# Patient Record
Sex: FEMALE | Race: WHITE | Employment: UNEMPLOYED | URBAN - METROPOLITAN AREA
[De-identification: names, ages, dates, MRNs, and addresses within clinical notes are randomized per-mention and may not be internally consistent; named-entity substitution may affect disease eponyms.]

---

## 2022-03-21 ENCOUNTER — APPOINTMENT (OUTPATIENT)
Dept: GENERAL RADIOLOGY | Age: 52
DRG: 418 | End: 2022-03-21

## 2022-03-21 ENCOUNTER — APPOINTMENT (OUTPATIENT)
Dept: CT IMAGING | Age: 52
DRG: 418 | End: 2022-03-21

## 2022-03-21 ENCOUNTER — HOSPITAL ENCOUNTER (INPATIENT)
Age: 52
LOS: 4 days | Discharge: HOME | DRG: 418 | End: 2022-03-26
Attending: INTERNAL MEDICINE | Admitting: INTERNAL MEDICINE

## 2022-03-21 DIAGNOSIS — R74.8 ELEVATED LIVER ENZYMES: ICD-10-CM

## 2022-03-21 DIAGNOSIS — R17 ICTERUS: ICD-10-CM

## 2022-03-21 DIAGNOSIS — R11.0 NAUSEA: ICD-10-CM

## 2022-03-21 DIAGNOSIS — K81.1 CHRONIC CHOLECYSTITIS: ICD-10-CM

## 2022-03-21 DIAGNOSIS — K80.50 CHOLEDOCHOLITHIASIS: Primary | ICD-10-CM

## 2022-03-21 DIAGNOSIS — R10.13 ABDOMINAL PAIN, EPIGASTRIC: ICD-10-CM

## 2022-03-21 LAB
A/G RATIO: 1.4 (ref 1.1–2.2)
ALBUMIN SERPL-MCNC: 4.2 G/DL (ref 3.4–5)
ALP BLD-CCNC: 281 U/L (ref 40–129)
ALT SERPL-CCNC: 369 U/L (ref 10–40)
AMMONIA: 37 UMOL/L (ref 11–51)
ANION GAP SERPL CALCULATED.3IONS-SCNC: 15 MMOL/L (ref 3–16)
AST SERPL-CCNC: 199 U/L (ref 15–37)
BACTERIA: ABNORMAL /HPF
BASOPHILS ABSOLUTE: 0.1 K/UL (ref 0–0.2)
BASOPHILS RELATIVE PERCENT: 1 %
BILIRUB SERPL-MCNC: 6.3 MG/DL (ref 0–1)
BILIRUBIN URINE: ABNORMAL
BLOOD, URINE: ABNORMAL
BUN BLDV-MCNC: 11 MG/DL (ref 7–20)
CALCIUM SERPL-MCNC: 9.6 MG/DL (ref 8.3–10.6)
CELLULAR CASTS: ABNORMAL /LPF
CHLORIDE BLD-SCNC: 96 MMOL/L (ref 99–110)
CLARITY: ABNORMAL
CO2: 23 MMOL/L (ref 21–32)
COLOR: ABNORMAL
COMMENT UA: ABNORMAL
CREAT SERPL-MCNC: <0.5 MG/DL (ref 0.6–1.1)
EOSINOPHILS ABSOLUTE: 0.3 K/UL (ref 0–0.6)
EOSINOPHILS RELATIVE PERCENT: 5 %
EPITHELIAL CELLS, UA: 11 /HPF (ref 0–5)
GFR AFRICAN AMERICAN: >60
GFR NON-AFRICAN AMERICAN: >60
GLUCOSE BLD-MCNC: 130 MG/DL (ref 70–99)
GLUCOSE URINE: 100 MG/DL
HCG QUALITATIVE: NEGATIVE
HCT VFR BLD CALC: 42.8 % (ref 36–48)
HEMOGLOBIN: 14.4 G/DL (ref 12–16)
HYALINE CASTS: ABNORMAL /LPF (ref 0–2)
KETONES, URINE: 40 MG/DL
LEUKOCYTE ESTERASE, URINE: NEGATIVE
LIPASE: 25 U/L (ref 13–60)
LYMPHOCYTES ABSOLUTE: 1.2 K/UL (ref 1–5.1)
LYMPHOCYTES RELATIVE PERCENT: 19.2 %
MCH RBC QN AUTO: 30 PG (ref 26–34)
MCHC RBC AUTO-ENTMCNC: 33.8 G/DL (ref 31–36)
MCV RBC AUTO: 88.7 FL (ref 80–100)
MICROSCOPIC EXAMINATION: YES
MONOCYTES ABSOLUTE: 0.5 K/UL (ref 0–1.3)
MONOCYTES RELATIVE PERCENT: 7.8 %
MUCUS: ABNORMAL /LPF
NEUTROPHILS ABSOLUTE: 4.1 K/UL (ref 1.7–7.7)
NEUTROPHILS RELATIVE PERCENT: 67 %
NITRITE, URINE: NEGATIVE
PDW BLD-RTO: 14.2 % (ref 12.4–15.4)
PH UA: 5.5 (ref 5–8)
PLATELET # BLD: 207 K/UL (ref 135–450)
PMV BLD AUTO: 8.6 FL (ref 5–10.5)
POTASSIUM REFLEX MAGNESIUM: 4.4 MMOL/L (ref 3.5–5.1)
PROTEIN UA: 100 MG/DL
RBC # BLD: 4.82 M/UL (ref 4–5.2)
RBC UA: 5 /HPF (ref 0–4)
RENAL EPITHELIAL, UA: ABNORMAL /HPF (ref 0–1)
SODIUM BLD-SCNC: 134 MMOL/L (ref 136–145)
SPECIFIC GRAVITY UA: 1.02 (ref 1–1.03)
TOTAL PROTEIN: 7.3 G/DL (ref 6.4–8.2)
TROPONIN: <0.01 NG/ML
URINE REFLEX TO CULTURE: YES
URINE TYPE: ABNORMAL
UROBILINOGEN, URINE: 1 E.U./DL
WBC # BLD: 6 K/UL (ref 4–11)
WBC UA: 15 /HPF (ref 0–5)

## 2022-03-21 PROCEDURE — 84484 ASSAY OF TROPONIN QUANT: CPT

## 2022-03-21 PROCEDURE — 74177 CT ABD & PELVIS W/CONTRAST: CPT

## 2022-03-21 PROCEDURE — 82140 ASSAY OF AMMONIA: CPT

## 2022-03-21 PROCEDURE — 80053 COMPREHEN METABOLIC PANEL: CPT

## 2022-03-21 PROCEDURE — 85025 COMPLETE CBC W/AUTO DIFF WBC: CPT

## 2022-03-21 PROCEDURE — 6360000004 HC RX CONTRAST MEDICATION: Performed by: NURSE PRACTITIONER

## 2022-03-21 PROCEDURE — 83690 ASSAY OF LIPASE: CPT

## 2022-03-21 PROCEDURE — 84703 CHORIONIC GONADOTROPIN ASSAY: CPT

## 2022-03-21 PROCEDURE — 99283 EMERGENCY DEPT VISIT LOW MDM: CPT

## 2022-03-21 PROCEDURE — 81001 URINALYSIS AUTO W/SCOPE: CPT

## 2022-03-21 PROCEDURE — 87086 URINE CULTURE/COLONY COUNT: CPT

## 2022-03-21 PROCEDURE — 93005 ELECTROCARDIOGRAM TRACING: CPT | Performed by: NURSE PRACTITIONER

## 2022-03-21 PROCEDURE — 80074 ACUTE HEPATITIS PANEL: CPT

## 2022-03-21 PROCEDURE — 71046 X-RAY EXAM CHEST 2 VIEWS: CPT

## 2022-03-21 RX ADMIN — IOPAMIDOL 75 ML: 755 INJECTION, SOLUTION INTRAVENOUS at 21:14

## 2022-03-21 ASSESSMENT — ENCOUNTER SYMPTOMS
VOMITING: 1
WHEEZING: 0
SHORTNESS OF BREATH: 0
DIARRHEA: 0
NAUSEA: 1
COLOR CHANGE: 0
PHOTOPHOBIA: 0
COUGH: 0
SORE THROAT: 0
ABDOMINAL PAIN: 1
BACK PAIN: 0

## 2022-03-21 NOTE — ED PROVIDER NOTES
**ADVANCED PRACTICE PROVIDER, I HAVE EVALUATED THIS PATIENT**        [facility]      Pt Name: Sarah Ramirez  XQS:[id number]  [place] 1970  Date of evaluation: 3/21/2022  Provider: Merlin Ensign, APRN - CNP      Chief Complaint:    Chief Complaint   Patient presents with    Dizziness     abdominal pain 3 days ago that has since resolved. Yellowing in both eyes since this morning. Dizziness and headache x 1 day. Denies pain, 1 episode of vomiting last night. Nursing Notes, Past Medical Hx, Past Surgical Hx, Social Hx, Allergies, and Family Hx were all reviewed and agreed with or any disagreements were addressed in the HPI.    HPI: (Location, Duration, Timing, Severity, Quality, Assoc Sx, Context, Modifying factors)    Chief Complaint of dizziness, abdominal pain and not feeling well    This is a  49 y.o. female who presents today with dizziness that is been going on for the past week however over the past 3 days she had some intermittent epigastric abdominal pain thinking it was gas, she states last night she had an episode of vomiting. She is also been dealing with a headache over the past 2 days but denies worst headache of life. She denies any blurred or lost vision. No photo or phonophobia. No neck pain or neck stiffness. She states she just does not feel good. She does admit that she smokes marijuana every night before bed otherwise no alcohol or drug use. She does smoke cigarettes, smokes about a pack a day. However, she is not having any chest pain pleuritic chest pain or shortness of breath otherwise she states that she is relatively healthy. She became concerned because she noticed that her eyes appeared to be yellow. She states she does not have any history of liver problems that she is aware of.   She denies any headache on exam.  No pain on exam, no additional complaints, no additional aggravating relieving factors. Patient presents awake, alert and in no acute respiratory distress or toxic appearance    PastMedical/Surgical History:  History reviewed. No pertinent past medical history. Procedure Laterality Date     SECTION         Medications:  Previous Medications    ALBUTEROL (PROVENTIL HFA) 108 (90 BASE) MCG/ACT INHALER    Inhale 2 puffs into the lungs every 6 hours as needed for Wheezing. Review of Systems:  (2-9 systems needed)  Review of Systems   Constitutional: Negative for chills and fever. HENT: Negative for congestion and sore throat. Eyes: Negative for photophobia and visual disturbance. Denies any blurred or lost vision. No photo or phonophobia. She did notice her sclera were yellow in color today which became concerning   Respiratory: Negative for cough, shortness of breath and wheezing. Cardiovascular: Negative for chest pain. Gastrointestinal: Positive for abdominal pain, nausea and vomiting. Negative for diarrhea. She states last night she noticed some epigastric abdominal pain, thought it was related to gas however, she started feeling very nauseous and then threw up, denies any diarrhea or blood in stool, no urinary symptoms   Genitourinary: Negative for dysuria, frequency, hematuria and urgency. Musculoskeletal: Negative for back pain. Skin: Negative for color change. Neurological: Positive for dizziness and headaches. Negative for weakness and numbness. Complaint of dizziness for the past 5 days, has a headache for the past 3 days however on exam she is denying a diffuse headache neck pain or neck stiffness. She states her dizziness is constant, does not matter if she is lying down or sitting or standing       \"Positives and Pertinent negatives as per HPI\"    Physical Exam:  Physical Exam  Vitals and nursing note reviewed. Constitutional:       Appearance: She is well-developed. She is not diaphoretic.    HENT:      Head: Normocephalic. Right Ear: External ear normal.      Left Ear: External ear normal.   Eyes:      General: Scleral icterus present. Right eye: No discharge. Left eye: No discharge. Comments: Pupils are 3 mm round reactive, normal extraocular, no visible nystagmus however she does have bilateral icterus which appears to be new   Cardiovascular:      Rate and Rhythm: Normal rate. Comments: Normal S1 and 2, peripheral pulses are 2+, no edema observed  Pulmonary:      Effort: Pulmonary effort is normal. No respiratory distress. Comments: Lungs are clear anteriorly, posteriorly she has some faint rhonchi in the left upper lobe however, she is not skeptic or dyspneic, she has a cigarette smoker its probably chronic, saturations are 96% on room  Abdominal:      Palpations: Abdomen is soft. Comments: Abdomen is soft and nondistended. Bowel sounds are positive, patient states last night she had some epigastric abdominal discomfort however on exam she has no abdominal tenderness, guarding or rebound tenderness. No ascites or rigidity   Musculoskeletal:         General: Normal range of motion. Cervical back: Normal range of motion and neck supple. Skin:     General: Skin is warm. Capillary Refill: Capillary refill takes less than 2 seconds. Coloration: Skin is not pale. Neurological:      General: No focal deficit present. Mental Status: She is alert and oriented to person, place, and time. GCS: GCS eye subscore is 4. GCS verbal subscore is 5. GCS motor subscore is 6. Cranial Nerves: Cranial nerves are intact. Sensory: Sensation is intact. Motor: Motor function is intact. Comments: Patient is awake, alert following commands correctly, neurologically intact no focal deficits.   Although the patient states she is dizzy, she has no dizziness on my physical exam.   Psychiatric:         Behavior: Behavior normal.         MEDICAL DECISION MAKING    Vitals:    Vitals:    03/21/22 1834   BP: (!) 171/103   Pulse: 94   Resp: 16   Temp: 98.4 °F (36.9 °C)   TempSrc: Oral   SpO2: 96%   Weight: 300 lb (136.1 kg)   Height: 5' 1\" (1.549 m)       LABS:  Labs Reviewed   COMPREHENSIVE METABOLIC PANEL W/ REFLEX TO MG FOR LOW K - Abnormal; Notable for the following components:       Result Value    Sodium 134 (*)     Chloride 96 (*)     Glucose 130 (*)     CREATININE <0.5 (*)     Total Bilirubin 6.3 (*)     Alkaline Phosphatase 281 (*)      (*)      (*)     All other components within normal limits   URINALYSIS WITH REFLEX TO CULTURE - Abnormal; Notable for the following components:    Clarity, UA CLOUDY (*)     Glucose, Ur 100 (*)     Bilirubin Urine LARGE (*)     Ketones, Urine 40 (*)     Blood, Urine TRACE-LYSED (*)     Protein,  (*)     All other components within normal limits   MICROSCOPIC URINALYSIS - Abnormal; Notable for the following components:    Cellular Cast, UA 3-5 Epith. (*)     Mucus, UA 2+ (*)     Renal Epithelial, UA 2-5 (*)     Bacteria, UA 1+ (*)     WBC, UA 15 (*)     RBC, UA 5 (*)     Epithelial Cells, UA 11 (*)     All other components within normal limits   CULTURE, URINE   CBC WITH AUTO DIFFERENTIAL   TROPONIN   AMMONIA   LIPASE   HCG, SERUM, QUALITATIVE   HEPATITIS PANEL, ACUTE        Remainder of labs reviewed and were negative at this time or not returned at the time of this note. RADIOLOGY:   Non-plain film images such as CT, Ultrasound and MRI are read by the radiologist. Constanza SAMUELS, MELVIN - CNP have directly visualized the radiologic plain film image(s) with the below findings:      Interpretation per the Radiologist below, if available at the time of this note:    CT ABDOMEN PELVIS W IV CONTRAST Additional Contrast? None   Final Result   1. Cholelithiasis and mild biliary dilatation. The possibilities of   choledocholithiasis and cholecystitis should be considered. Consider   follow-up with MRCP. 2.  Clustered nodular opacities in the medial left lung base measure up to 12   mm. Question vascular association. Nonemergent follow-up with   contrast-enhanced chest CT is recommended, if not previously performed. This   could also be assessed on contrast-enhanced MRCP. RECOMMENDATIONS:   Multiple pulmonary nodules. Most severe: 12 mm left solid pulmonary nodule   detected on incomplete chest CT. Recommend prompt non-contrast Chest CT for   further evaluation. These guidelines do not apply to immunocompromised patients and patients with   cancer. Follow up in patients with significant comorbidities as clinically   warranted. For lung cancer screening, adhere to Lung-RADS guidelines. Reference: Radiology. 2017; 284(1):228-43. XR CHEST (2 VW)   Final Result   No acute cardiopulmonary disease. MEDICAL DECISION MAKING / ED COURSE:    Because of high probability of sudden clinical deterioration of the patient's condition and risk of further deterioration, critical care time required my full attention to the patient's condition; which included chart data review, documentation, medication ordering, reviewing the patient's old records, reevaluation patient's cardiac, pulmonary and neurological status. Reevaluation of vital signs. Consultations with ED attending and admitting physician. Ordering, interpreting reviewing diagnostic testing. Therefore a critical care time was 42 minutes of direct attention to the patient's condition did not include time spent on procedures. PROCEDURES:   Procedures    None    Patient was given:  Medications   iopamidol (ISOVUE-370) 76 % injection 75 mL (75 mLs IntraVENous Given 3/21/22 2114)       Patient complains of dizziness that is been going on for the past week however over the past 3 days she had some intermittent epigastric abdominal pain thinking it was gas, she states last night she had an episode of vomiting.   She is also been dealing with a headache over the past 2 days but denies worst headache of life. After evaluation and examination the patient IV access, blood work, chest x-ray, EKG, CT scan of the abdomen and orthostatic vital signs were ordered. Patient has bilateral icterus which appears to be new, I do have concerns for underlying liver problems, I ordered acute hepatitis panel. CBC shows no sepsis or anemia. Metabolic panel is concerning as elevated liver functions, electrolytes are normal, patient's total bilirubin is 6.3, AST is 199, ALT is 369 alk phos is 281 however her lipase is normal.  Troponin is negative. Ammonia level is normal.  Pregnancy is negative. Urine shows ketonuria with a gross amount of epithelial cells, this should improve with fluids. CT the abdomen shows cholelithiasis and mild biliary dilation, concerns of a choledocholithiasis and cholecystitis can be considered recommending MRCP. Patient has clustered nodular opacity in the medial left lung recommend a nonemergent follow-up. Because of the concerns of choledocholithiasis I paged general surgery on-call. At 2332 I spoke with Dr. Sheffield [facility] surgery on-call, we discussed the patient's case at length, he agrees with my plan of care that the patient needs to be admitted to the hospital with consultation to GI services due to possibly needing an MRCP and eventually may need a cholecystectomy. Was paged for admission, at 0012 I spoke face with the hospitalist, Dr. Phuong Vegas, as the patient's case at length, he agreed to accept the patient for admission. The patient tolerated their visit well. I evaluated the patient. The physician was available for consultation as needed. The patient and / or the family were informed of the results of any tests, a time was given to answer questions, a plan was proposed and they agreed with plan. Will be admitted to hospital for further evaluation management care. CLINICAL IMPRESSION:  1. Choledocholithiasis    2.  Icterus

## 2022-03-22 ENCOUNTER — ANESTHESIA EVENT (OUTPATIENT)
Dept: OPERATING ROOM | Age: 52
DRG: 418 | End: 2022-03-22

## 2022-03-22 ENCOUNTER — ANESTHESIA EVENT (OUTPATIENT)
Dept: ENDOSCOPY | Age: 52
DRG: 418 | End: 2022-03-22

## 2022-03-22 ENCOUNTER — APPOINTMENT (OUTPATIENT)
Dept: CT IMAGING | Age: 52
DRG: 418 | End: 2022-03-22

## 2022-03-22 PROBLEM — K80.50 CHOLEDOCHOLITHIASIS: Status: ACTIVE | Noted: 2022-03-22

## 2022-03-22 LAB
A/G RATIO: 1.4 (ref 1.1–2.2)
ALBUMIN SERPL-MCNC: 3.8 G/DL (ref 3.4–5)
ALP BLD-CCNC: 261 U/L (ref 40–129)
ALT SERPL-CCNC: 328 U/L (ref 10–40)
ANION GAP SERPL CALCULATED.3IONS-SCNC: 11 MMOL/L (ref 3–16)
AST SERPL-CCNC: 171 U/L (ref 15–37)
BASOPHILS ABSOLUTE: 0 K/UL (ref 0–0.2)
BASOPHILS RELATIVE PERCENT: 0.5 %
BILIRUB SERPL-MCNC: 6.4 MG/DL (ref 0–1)
BUN BLDV-MCNC: 9 MG/DL (ref 7–20)
CALCIUM SERPL-MCNC: 9.4 MG/DL (ref 8.3–10.6)
CHLORIDE BLD-SCNC: 101 MMOL/L (ref 99–110)
CO2: 24 MMOL/L (ref 21–32)
CREAT SERPL-MCNC: 0.5 MG/DL (ref 0.6–1.1)
EKG ATRIAL RATE: 90 BPM
EKG DIAGNOSIS: NORMAL
EKG P AXIS: 52 DEGREES
EKG P-R INTERVAL: 184 MS
EKG Q-T INTERVAL: 360 MS
EKG QRS DURATION: 82 MS
EKG QTC CALCULATION (BAZETT): 440 MS
EKG R AXIS: 60 DEGREES
EKG T AXIS: 43 DEGREES
EKG VENTRICULAR RATE: 90 BPM
EOSINOPHILS ABSOLUTE: 0.3 K/UL (ref 0–0.6)
EOSINOPHILS RELATIVE PERCENT: 4.2 %
GFR AFRICAN AMERICAN: >60
GFR NON-AFRICAN AMERICAN: >60
GLUCOSE BLD-MCNC: 101 MG/DL (ref 70–99)
HAV IGM SER IA-ACNC: NORMAL
HCT VFR BLD CALC: 39.6 % (ref 36–48)
HEMOGLOBIN: 13.3 G/DL (ref 12–16)
HEPATITIS B CORE IGM ANTIBODY: NORMAL
HEPATITIS B SURFACE ANTIGEN INTERPRETATION: NORMAL
HEPATITIS C ANTIBODY INTERPRETATION: NORMAL
LYMPHOCYTES ABSOLUTE: 0.9 K/UL (ref 1–5.1)
LYMPHOCYTES RELATIVE PERCENT: 15 %
MAGNESIUM: 2 MG/DL (ref 1.8–2.4)
MCH RBC QN AUTO: 30.1 PG (ref 26–34)
MCHC RBC AUTO-ENTMCNC: 33.6 G/DL (ref 31–36)
MCV RBC AUTO: 89.7 FL (ref 80–100)
MONOCYTES ABSOLUTE: 0.5 K/UL (ref 0–1.3)
MONOCYTES RELATIVE PERCENT: 8.5 %
NEUTROPHILS ABSOLUTE: 4.4 K/UL (ref 1.7–7.7)
NEUTROPHILS RELATIVE PERCENT: 71.8 %
PDW BLD-RTO: 14.2 % (ref 12.4–15.4)
PHOSPHORUS: 3.3 MG/DL (ref 2.5–4.9)
PLATELET # BLD: 193 K/UL (ref 135–450)
PMV BLD AUTO: 9.1 FL (ref 5–10.5)
POTASSIUM SERPL-SCNC: 3.7 MMOL/L (ref 3.5–5.1)
RBC # BLD: 4.42 M/UL (ref 4–5.2)
SARS-COV-2, NAAT: NOT DETECTED
SODIUM BLD-SCNC: 136 MMOL/L (ref 136–145)
TOTAL PROTEIN: 6.5 G/DL (ref 6.4–8.2)
URINE CULTURE, ROUTINE: NORMAL
WBC # BLD: 6.1 K/UL (ref 4–11)

## 2022-03-22 PROCEDURE — 99222 1ST HOSP IP/OBS MODERATE 55: CPT | Performed by: SURGERY

## 2022-03-22 PROCEDURE — 2580000003 HC RX 258: Performed by: INTERNAL MEDICINE

## 2022-03-22 PROCEDURE — 84100 ASSAY OF PHOSPHORUS: CPT

## 2022-03-22 PROCEDURE — 6360000002 HC RX W HCPCS: Performed by: PHYSICIAN ASSISTANT

## 2022-03-22 PROCEDURE — 6360000002 HC RX W HCPCS: Performed by: INTERNAL MEDICINE

## 2022-03-22 PROCEDURE — 6370000000 HC RX 637 (ALT 250 FOR IP): Performed by: INTERNAL MEDICINE

## 2022-03-22 PROCEDURE — 93010 ELECTROCARDIOGRAM REPORT: CPT | Performed by: INTERNAL MEDICINE

## 2022-03-22 PROCEDURE — 1200000000 HC SEMI PRIVATE

## 2022-03-22 PROCEDURE — 94760 N-INVAS EAR/PLS OXIMETRY 1: CPT

## 2022-03-22 PROCEDURE — 83735 ASSAY OF MAGNESIUM: CPT

## 2022-03-22 PROCEDURE — 2580000003 HC RX 258: Performed by: PHYSICIAN ASSISTANT

## 2022-03-22 PROCEDURE — 71250 CT THORAX DX C-: CPT

## 2022-03-22 PROCEDURE — 94640 AIRWAY INHALATION TREATMENT: CPT

## 2022-03-22 PROCEDURE — 87635 SARS-COV-2 COVID-19 AMP PRB: CPT

## 2022-03-22 PROCEDURE — 85025 COMPLETE CBC W/AUTO DIFF WBC: CPT

## 2022-03-22 PROCEDURE — 94669 MECHANICAL CHEST WALL OSCILL: CPT

## 2022-03-22 PROCEDURE — 99223 1ST HOSP IP/OBS HIGH 75: CPT | Performed by: INTERNAL MEDICINE

## 2022-03-22 PROCEDURE — 80053 COMPREHEN METABOLIC PANEL: CPT

## 2022-03-22 RX ORDER — PANTOPRAZOLE SODIUM 40 MG/1
40 TABLET, DELAYED RELEASE ORAL
Status: DISCONTINUED | OUTPATIENT
Start: 2022-03-22 | End: 2022-03-26 | Stop reason: HOSPADM

## 2022-03-22 RX ORDER — SODIUM CHLORIDE 0.9 % (FLUSH) 0.9 %
10 SYRINGE (ML) INJECTION PRN
Status: DISCONTINUED | OUTPATIENT
Start: 2022-03-22 | End: 2022-03-26 | Stop reason: HOSPADM

## 2022-03-22 RX ORDER — OXYCODONE HYDROCHLORIDE 5 MG/1
5 TABLET ORAL EVERY 4 HOURS PRN
Status: DISCONTINUED | OUTPATIENT
Start: 2022-03-22 | End: 2022-03-26 | Stop reason: HOSPADM

## 2022-03-22 RX ORDER — ONDANSETRON 2 MG/ML
4 INJECTION INTRAMUSCULAR; INTRAVENOUS EVERY 6 HOURS PRN
Status: DISCONTINUED | OUTPATIENT
Start: 2022-03-22 | End: 2022-03-26 | Stop reason: HOSPADM

## 2022-03-22 RX ORDER — CALCIUM CARBONATE 200(500)MG
500 TABLET,CHEWABLE ORAL 3 TIMES DAILY PRN
Status: DISCONTINUED | OUTPATIENT
Start: 2022-03-22 | End: 2022-03-26 | Stop reason: HOSPADM

## 2022-03-22 RX ORDER — SODIUM CHLORIDE 9 MG/ML
25 INJECTION, SOLUTION INTRAVENOUS PRN
Status: DISCONTINUED | OUTPATIENT
Start: 2022-03-22 | End: 2022-03-26 | Stop reason: HOSPADM

## 2022-03-22 RX ORDER — ACETAMINOPHEN 325 MG/1
650 TABLET ORAL EVERY 6 HOURS PRN
Status: CANCELLED | OUTPATIENT
Start: 2022-03-22

## 2022-03-22 RX ORDER — KETOROLAC TROMETHAMINE 15 MG/ML
15 INJECTION, SOLUTION INTRAMUSCULAR; INTRAVENOUS EVERY 6 HOURS PRN
Status: DISCONTINUED | OUTPATIENT
Start: 2022-03-22 | End: 2022-03-26 | Stop reason: HOSPADM

## 2022-03-22 RX ORDER — ALBUTEROL SULFATE 90 UG/1
2 AEROSOL, METERED RESPIRATORY (INHALATION) 2 TIMES DAILY
Status: DISCONTINUED | OUTPATIENT
Start: 2022-03-22 | End: 2022-03-25

## 2022-03-22 RX ORDER — SODIUM CHLORIDE, SODIUM LACTATE, POTASSIUM CHLORIDE, CALCIUM CHLORIDE 600; 310; 30; 20 MG/100ML; MG/100ML; MG/100ML; MG/100ML
INJECTION, SOLUTION INTRAVENOUS CONTINUOUS
Status: DISCONTINUED | OUTPATIENT
Start: 2022-03-22 | End: 2022-03-24

## 2022-03-22 RX ORDER — LABETALOL HYDROCHLORIDE 5 MG/ML
10 INJECTION, SOLUTION INTRAVENOUS EVERY 4 HOURS PRN
Status: DISCONTINUED | OUTPATIENT
Start: 2022-03-22 | End: 2022-03-26

## 2022-03-22 RX ORDER — SODIUM CHLORIDE 0.9 % (FLUSH) 0.9 %
5-40 SYRINGE (ML) INJECTION EVERY 12 HOURS SCHEDULED
Status: DISCONTINUED | OUTPATIENT
Start: 2022-03-22 | End: 2022-03-26 | Stop reason: HOSPADM

## 2022-03-22 RX ORDER — ONDANSETRON 4 MG/1
4 TABLET, ORALLY DISINTEGRATING ORAL EVERY 8 HOURS PRN
Status: DISCONTINUED | OUTPATIENT
Start: 2022-03-22 | End: 2022-03-26 | Stop reason: HOSPADM

## 2022-03-22 RX ORDER — ACETAMINOPHEN 650 MG/1
650 SUPPOSITORY RECTAL EVERY 6 HOURS PRN
Status: CANCELLED | OUTPATIENT
Start: 2022-03-22

## 2022-03-22 RX ORDER — POLYETHYLENE GLYCOL 3350 17 G/17G
17 POWDER, FOR SOLUTION ORAL DAILY PRN
Status: DISCONTINUED | OUTPATIENT
Start: 2022-03-22 | End: 2022-03-26 | Stop reason: HOSPADM

## 2022-03-22 RX ORDER — ALBUTEROL SULFATE 90 UG/1
2 AEROSOL, METERED RESPIRATORY (INHALATION) EVERY 4 HOURS PRN
Status: DISCONTINUED | OUTPATIENT
Start: 2022-03-22 | End: 2022-03-26 | Stop reason: HOSPADM

## 2022-03-22 RX ADMIN — CALCIUM CARBONATE 500 MG: 500 TABLET, CHEWABLE ORAL at 02:55

## 2022-03-22 RX ADMIN — SODIUM CHLORIDE, POTASSIUM CHLORIDE, SODIUM LACTATE AND CALCIUM CHLORIDE: 600; 310; 30; 20 INJECTION, SOLUTION INTRAVENOUS at 03:01

## 2022-03-22 RX ADMIN — PANTOPRAZOLE SODIUM 40 MG: 40 TABLET, DELAYED RELEASE ORAL at 06:37

## 2022-03-22 RX ADMIN — KETOROLAC TROMETHAMINE 15 MG: 15 INJECTION, SOLUTION INTRAMUSCULAR; INTRAVENOUS at 11:28

## 2022-03-22 RX ADMIN — OXYCODONE 5 MG: 5 TABLET ORAL at 02:55

## 2022-03-22 RX ADMIN — Medication 2 PUFF: at 20:06

## 2022-03-22 RX ADMIN — PIPERACILLIN AND TAZOBACTAM 3375 MG: 3; .375 INJECTION, POWDER, LYOPHILIZED, FOR SOLUTION INTRAVENOUS at 16:52

## 2022-03-22 RX ADMIN — Medication 2 PUFF: at 08:59

## 2022-03-22 RX ADMIN — ONDANSETRON 4 MG: 2 INJECTION INTRAMUSCULAR; INTRAVENOUS at 02:55

## 2022-03-22 RX ADMIN — ONDANSETRON 4 MG: 2 INJECTION INTRAMUSCULAR; INTRAVENOUS at 09:42

## 2022-03-22 ASSESSMENT — PAIN SCALES - GENERAL
PAINLEVEL_OUTOF10: 0
PAINLEVEL_OUTOF10: 8
PAINLEVEL_OUTOF10: 7
PAINLEVEL_OUTOF10: 6

## 2022-03-22 ASSESSMENT — LIFESTYLE VARIABLES: SMOKING_STATUS: 1

## 2022-03-22 ASSESSMENT — PAIN DESCRIPTION - PAIN TYPE: TYPE: ACUTE PAIN

## 2022-03-22 ASSESSMENT — PAIN DESCRIPTION - LOCATION: LOCATION: HEAD;ABDOMEN;CHEST

## 2022-03-22 NOTE — PROGRESS NOTES
Patient admitted after midnight with choledocholithiasis. CT abd/chest also mentioned cluster of possible lung nodules on LLL. Denies weight loss, cough, sob. Will consult pulm. Going for ERCP tomorrow and lap juany to follow. Continue morphine for pain. Give dose of toradol for headache.        Lidia Romano PA-C

## 2022-03-22 NOTE — ANESTHESIA PRE PROCEDURE
Department of Anesthesiology  Preprocedure Note       Name:  Nitesh Lopez   Age:  49 y.o.  :  1970                                          MRN:  [id number]         Date:  3/22/2022      Surgeon: Haily Valencia):  Jayden Tabares MD    Procedure: Procedure(s):  LAPAROSCOPIC CHOLECYSTECTOMY WITH CHOLANGIOGRAM    Medications prior to admission:   Prior to Admission medications    Medication Sig Start Date End Date Taking? Authorizing Provider   albuterol (PROVENTIL HFA) 108 (90 BASE) MCG/ACT inhaler Inhale 2 puffs into the lungs every 6 hours as needed for Wheezing.   Patient not taking: Reported on 3/22/2022 4/16/15   Balaji Rodrigues MD       Current medications:    Current Facility-Administered Medications   Medication Dose Route Frequency Provider Last Rate Last Admin    albuterol sulfate  (90 Base) MCG/ACT inhaler 2 puff  2 puff Inhalation Q4H PRN Duong Quarles MD        lactated ringers infusion   IntraVENous Continuous Duong Quarles  mL/hr at 22 0301 New Bag at 22 0301    oxyCODONE (ROXICODONE) immediate release tablet 5 mg  5 mg Oral Q4H PRN Duong Quarles MD   5 mg at 22 0255    sodium chloride flush 0.9 % injection 5-40 mL  5-40 mL IntraVENous 2 times per day Duong Quarles MD        sodium chloride flush 0.9 % injection 10 mL  10 mL IntraVENous PRN Duong Quarles MD        0.9 % sodium chloride infusion  25 mL IntraVENous PRN MD Raudel Soto [facility] ON 3/23/2022] enoxaparin (LOVENOX) injection 40 mg  40 mg SubCUTAneous Daily Vitor Gentile MD        ondansetron (ZOFRAN-ODT) disintegrating tablet 4 mg  4 mg Oral Q8H PRN Duong Quarles MD        Or    ondansetron (ZOFRAN) injection 4 mg  4 mg IntraVENous Q6H PRN Duong Quarles MD   4 mg at 22 0942    polyethylene glycol (GLYCOLAX) packet 17 g  17 g Oral Daily PRN Duong Quarles MD        labetalol (NORMODYNE;TRANDATE) injection 10 mg  10 mg IntraVENous Q4H PRN Anita Aparicio MD        pantoprazole (PROTONIX) tablet 40 mg  40 mg Oral QAM AC Anita Aparicio MD   40 mg at 22 8153    calcium carbonate (TUMS) chewable tablet 500 mg  500 mg Oral TID PRN Anita Aparicio MD   500 mg at 22 0255    albuterol sulfate  (90 Base) MCG/ACT inhaler 2 puff  2 puff Inhalation BID Anita Aparicio MD   2 puff at 22 0859    piperacillin-tazobactam (ZOSYN) 3,375 mg in dextrose 5 % 50 mL IVPB extended infusion (mini-bag)  3,375 mg IntraVENous K0H Lizeth Barrios PA-C           Allergies:  No Known Allergies    Problem List:    Patient Active Problem List   Diagnosis Code    Choledocholithiasis K80.50       Past Medical History:  History reviewed. No pertinent past medical history. Past Surgical History:        Procedure Laterality Date     SECTION         Social History:    Social History     Tobacco Use    Smoking status: Current Every Day Smoker     Packs/day: 1.00     Years: 10.00     Pack years: 10.00    Smokeless tobacco: Never Used   Substance Use Topics    Alcohol use: Yes     Comment: occas                                Ready to quit: Not Answered  Counseling given: Not Answered      Vital Signs (Current):   Vitals:    22 0127 22 0215 22 0452 22 0930   BP: (!) 146/70 (!) 156/72 113/72 139/74   Pulse:  85 83 98   Resp:  18 18 17   Temp:  36.4 °C (97.5 °F) 36.7 °C (98.1 °F) 36.5 °C (97.7 °F)   TempSrc:  Oral Oral Axillary   SpO2: 97% 96% 94%    Weight:  293 lb 8 oz (133.1 kg)     Height:  5' 1\" (1.549 m)                                                BP Readings from Last 3 Encounters:   22 139/74       NPO Status:                                                                                 BMI:   Wt Readings from Last 3 Encounters:   22 293 lb 8 oz (133.1 kg)     Body mass index is 55.46 kg/m².     CBC:   Lab Results   Component Value Date    WBC 6.1 2022    RBC 4.42 2022 HGB 13.3 03/22/2022    HCT 39.6 03/22/2022    MCV 89.7 03/22/2022    RDW 14.2 03/22/2022     03/22/2022       CMP:   Lab Results   Component Value Date     03/22/2022    K 3.7 03/22/2022    K 4.4 03/21/2022     03/22/2022    CO2 24 03/22/2022    BUN 9 03/22/2022    CREATININE 0.5 03/22/2022    GFRAA >60 03/22/2022    GFRAA >60 10/12/2010    AGRATIO 1.4 03/22/2022    LABGLOM >60 03/22/2022    GLUCOSE 101 03/22/2022    PROT 6.5 03/22/2022    PROT 6.8 10/12/2010    CALCIUM 9.4 03/22/2022    BILITOT 6.4 03/22/2022    ALKPHOS 261 03/22/2022     03/22/2022     03/22/2022       POC Tests: No results for input(s): POCGLU, POCNA, POCK, POCCL, POCBUN, POCHEMO, POCHCT in the last 72 hours. Coags: No results found for: PROTIME, INR, APTT    HCG (If Applicable): No results found for: PREGTESTUR, PREGSERUM, HCG, HCGQUANT     ABGs: No results found for: PHART, PO2ART, QXR4TVO, BRL4GTY, BEART, Q8PMWJOP     Type & Screen (If Applicable):  No results found for: LABABO, LABRH    Drug/Infectious Status (If Applicable):  No results found for: HIV, HEPCAB    COVID-19 Screening (If Applicable): No results found for: COVID19        Anesthesia Evaluation  Patient summary reviewed and Nursing notes reviewed no history of anesthetic complications:   Airway:         Dental:          Pulmonary:                              Cardiovascular:                      Neuro/Psych:               GI/Hepatic/Renal:             Endo/Other:                      ROS comment: Morbid Obesity Abdominal:   (+) obese,           Vascular: Other Findings:             Anesthesia Plan      general     ASA 2       Induction: intravenous. Plan discussed with attending.                   Marek Mejia, APRN - CRNA   3/22/2022

## 2022-03-22 NOTE — RT PROTOCOL NOTE
RT Inhaler-Nebulizer Bronchodilator Protocol Note    There is a bronchodilator order in the chart from a provider indicating to follow the RT Bronchodilator Protocol and there is an Initiate RT Inhaler-Nebulizer Bronchodilator Protocol order as well (see protocol at bottom of note). CXR Findings:  No results found. The findings from the last RT Protocol Assessment were as follows:   History Pulmonary Disease: None or smoker <15 pack years  Respiratory Pattern: Regular pattern and RR 12-20 bpm  Breath Sounds: Inspiratory and expiratory or bilateral wheezing and/or rhonchi  Cough: Strong, spontaneous, non-productive  Indication for Bronchodilator Therapy:    Bronchodilator Assessment Score: 6    Aerosolized bronchodilator medication orders have been revised according to the RT Inhaler-Nebulizer Bronchodilator Protocol below. Respiratory Therapist to perform RT Therapy Protocol Assessment initially then follow the protocol. Repeat RT Therapy Protocol Assessment PRN for score 0-3 or on second treatment, BID, and PRN for scores above 3. No Indications  adjust the frequency to every 6 hours PRN wheezing or bronchospasm, if no treatments needed after 48 hours then discontinue using Per Protocol order mode. If indication present, adjust the RT bronchodilator orders based on the Bronchodilator Assessment Score as indicated below. Use Inhaler orders unless patient has one or more of the following: on home nebulizer, not able to hold breath for 10 seconds, is not alert and oriented, cannot activate and use MDI correctly, or respiratory rate 25 breaths per minute or more, then use the equivalent nebulizer order(s) with same Frequency and PRN reasons based on the score. If a patient is on this medication at home then do not decrease Frequency below that used at home.     0-3  enter or revise RT bronchodilator order(s) to equivalent RT Bronchodilator order with Frequency of every 4 hours PRN for wheezing or increased work of breathing using Per Protocol order mode. 4-6  enter or revise RT Bronchodilator order(s) to two equivalent RT bronchodilator orders with one order with BID Frequency and one order with Frequency of every 4 hours PRN wheezing or increased work of breathing using Per Protocol order mode. 7-10  enter or revise RT Bronchodilator order(s) to two equivalent RT bronchodilator orders with one order with TID Frequency and one order with Frequency of every 4 hours PRN wheezing or increased work of breathing using Per Protocol order mode. 11-13  enter or revise RT Bronchodilator order(s) to one equivalent RT bronchodilator order with QID Frequency and an Albuterol order with Frequency of every 4 hours PRN wheezing or increased work of breathing using Per Protocol order mode. Greater than 13  enter or revise RT Bronchodilator order(s) to one equivalent RT bronchodilator order with every 4 hours Frequency and an Albuterol order with Frequency of every 2 hours PRN wheezing or increased work of breathing using Per Protocol order mode. RT to enter RT Home Evaluation for COPD & MDI Assessment order using Per Protocol order mode.     Electronically signed by Giovani Daly RCP on 3/22/2022 at 3:57 AM
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
## 2022-03-22 NOTE — ANESTHESIA PRE PROCEDURE
Department of Anesthesiology  Preprocedure Note       Name:  Migue Ceballos   Age:  49 y.o.  :  1970                                          MRN:  [id number]         Date:  3/22/2022      Surgeon: Dominique Baxter):  Juan Diego Farnsworth MD    Procedure: Procedure(s):  ERCP SPHINCTER/PAPILLOTOMY    Medications prior to admission:   Prior to Admission medications    Medication Sig Start Date End Date Taking? Authorizing Provider   albuterol (PROVENTIL HFA) 108 (90 BASE) MCG/ACT inhaler Inhale 2 puffs into the lungs every 6 hours as needed for Wheezing. Patient not taking: Reported on 3/22/2022 4/16/15   Codey Beltran MD       Current medications:    No current facility-administered medications for this visit. No current outpatient medications on file.      Facility-Administered Medications Ordered in Other Visits   Medication Dose Route Frequency Provider Last Rate Last Admin    albuterol sulfate  (90 Base) MCG/ACT inhaler 2 puff  2 puff Inhalation Q4H PRN Fady Hogeu MD        lactated ringers infusion   IntraVENous Continuous Fady Hogue  mL/hr at 22 0301 New Bag at 22 0301    oxyCODONE (ROXICODONE) immediate release tablet 5 mg  5 mg Oral Q4H PRN Fady Hogue MD   5 mg at 22 0255    sodium chloride flush 0.9 % injection 5-40 mL  5-40 mL IntraVENous 2 times per day Fady Hogue MD        sodium chloride flush 0.9 % injection 10 mL  10 mL IntraVENous PRN Fady Hogue MD        0.9 % sodium chloride infusion  25 mL IntraVENous PRN MD Alicia Monet Cherelle Mccollum ON 3/23/2022] enoxaparin (LOVENOX) injection 40 mg  40 mg SubCUTAneous Daily Vitor Rosenthal MD        ondansetron (ZOFRAN-ODT) disintegrating tablet 4 mg  4 mg Oral Q8H PRN Fady Hogue MD        Or    ondansetron (ZOFRAN) injection 4 mg  4 mg IntraVENous Q6H PRN Fady Hogue MD   4 mg at 22 0942    polyethylene glycol (GLYCOLAX) packet 17 g  17 g Oral Daily PRN Ralph Zavala MD        labetalol (NORMODYNE;TRANDATE) injection 10 mg  10 mg IntraVENous Q4H PRN Ralph Zavala MD        pantoprazole (PROTONIX) tablet 40 mg  40 mg Oral QAM AC Ralph Zavala MD   40 mg at 22 1191    calcium carbonate (TUMS) chewable tablet 500 mg  500 mg Oral TID PRN Ralph Zavala MD   500 mg at 22 0255    albuterol sulfate  (90 Base) MCG/ACT inhaler 2 puff  2 puff Inhalation BID Ralph Zavala MD   2 puff at 22 0859    piperacillin-tazobactam (ZOSYN) 3,375 mg in dextrose 5 % 50 mL IVPB extended infusion (mini-bag)  3,375 mg IntraVENous B0L Lizethkameron Barrios PA-C        ketorolac (TORADOL) injection 15 mg  15 mg IntraVENous Q6H PRN Kareen Mendez PA-C   15 mg at 22 1128       Allergies:  No Known Allergies    Problem List:    Patient Active Problem List   Diagnosis Code    Choledocholithiasis K80.50       Past Medical History:  No past medical history on file. Past Surgical History:        Procedure Laterality Date     SECTION         Social History:    Social History     Tobacco Use    Smoking status: Current Every Day Smoker     Packs/day: 1.00     Years: 10.00     Pack years: 10.00    Smokeless tobacco: Never Used   Substance Use Topics    Alcohol use: Yes     Comment: occas                                Ready to quit: Not Answered  Counseling given: Not Answered      Vital Signs (Current): There were no vitals filed for this visit.                                            BP Readings from Last 3 Encounters:   22 139/74       NPO Status:  before mn                                                                                BMI:   Wt Readings from Last 3 Encounters:   22 293 lb 8 oz (133.1 kg)     There is no height or weight on file to calculate BMI.    CBC:   Lab Results   Component Value Date    WBC 6.1 2022    RBC 4.42 2022    HGB 13.3 2022    HCT 39.6 2022    MCV 89.7 03/22/2022    RDW 14.2 03/22/2022     03/22/2022       CMP:   Lab Results   Component Value Date     03/22/2022    K 3.7 03/22/2022    K 4.4 03/21/2022     03/22/2022    CO2 24 03/22/2022    BUN 9 03/22/2022    CREATININE 0.5 03/22/2022    GFRAA >60 03/22/2022    GFRAA >60 10/12/2010    AGRATIO 1.4 03/22/2022    LABGLOM >60 03/22/2022    GLUCOSE 101 03/22/2022    PROT 6.5 03/22/2022    PROT 6.8 10/12/2010    CALCIUM 9.4 03/22/2022    BILITOT 6.4 03/22/2022    ALKPHOS 261 03/22/2022     03/22/2022     03/22/2022       POC Tests: No results for input(s): POCGLU, POCNA, POCK, POCCL, POCBUN, POCHEMO, POCHCT in the last 72 hours. Coags: No results found for: PROTIME, INR, APTT    HCG (If Applicable): No results found for: PREGTESTUR, PREGSERUM, HCG, HCGQUANT     ABGs: No results found for: PHART, PO2ART, BBI5GHW, UAT3OQM, BEART, C7IRTIBJ     Type & Screen (If Applicable):  No results found for: LABABO, LABRH    Drug/Infectious Status (If Applicable):  No results found for: HIV, HEPCAB    COVID-19 Screening (If Applicable): No results found for: COVID19        Anesthesia Evaluation  Patient summary reviewed and Nursing notes reviewed no history of anesthetic complications:   Airway: Mallampati: II  TM distance: >3 FB   Neck ROM: full  Mouth opening: > = 3 FB Dental: normal exam   (+) poor dentition      Pulmonary:normal exam  breath sounds clear to auscultation  (+) asthma: current smoker                           Cardiovascular:Negative CV ROS          ECG reviewed  Rhythm: regular  Rate: normal                    Neuro/Psych:   Negative Neuro/Psych ROS              GI/Hepatic/Renal:   (+) morbid obesity         ROS comment: Elevated liver enzymes  . Endo/Other: Negative Endo/Other ROS                     ROS comment: Morbid Obesity Abdominal:   (+) obese,           Vascular: negative vascular ROS.          Other Findings:             Anesthesia Plan      general     ASA 3     (Chart review)  Induction: intravenous. MIPS: Postoperative opioids intended and Prophylactic antiemetics administered. Plan discussed with attending and CRNA.     Attending anesthesiologist reviewed and agrees with Preprocedure content            MELVIN Allen - CRNA   3/22/2022

## 2022-03-22 NOTE — CONSULTS
Gastroenterology Consult Note        Patient: Devin Leos  : 1970  Acct#:      Date:  3/22/2022    Subjective:       History of Present Illness  Patient is a 49 y.o.   female admitted with Abdominal pain, epigastric [R10.13]  Choledocholithiasis [K80.50]  Icterus [R17]  Nausea [R11.0]  Elevated liver enzymes [R74.8] who is seen in consult for CBD stone. Patient came to the ER yesterday for dizziness and jaundice. Has had \"gas pain\" in the upper abdomen for a week. Has had intermittent nausea. Had nonbloody emesis once. No fevers or chills. No melena or hematochezia. Also reported headache. In the ER she was found to have elevated liver enzymes and a bilirubin of 6. CT showed gallstones and mild biliary dilation. She feels okay this morning. Has some nausea. Has had similar gas pain in the past but states she will drink water and it would resolve. History reviewed. No pertinent past medical history. Past Surgical History:   Procedure Laterality Date     SECTION        Past Endoscopic History    Admission Meds  No current facility-administered medications on file prior to encounter. Current Outpatient Medications on File Prior to Encounter   Medication Sig Dispense Refill    albuterol (PROVENTIL HFA) 108 (90 BASE) MCG/ACT inhaler Inhale 2 puffs into the lungs every 6 hours as needed for Wheezing. (Patient not taking: Reported on 3/22/2022) 1 Inhaler 3            Allergies  No Known Allergies   Social   Social History     Tobacco Use    Smoking status: Current Every Day Smoker     Packs/day: 1.00     Years: 10.00     Pack years: 10.00    Smokeless tobacco: Never Used   Substance Use Topics    Alcohol use: Yes     Comment: occas        History reviewed. No pertinent family history.         Review of Systems  Constitutional: negative for fevers, chills, sweats    Ears, nose, mouth, throat, and face: negative for nasal congestion and sore throat   Respiratory: negative for cough and shortness of breath   Cardiovascular: negative for chest pain and dyspnea   Gastrointestinal: see hpi   Genitourinary:negative for dysuria and frequency   Integument/breast: negative for pruritus and rash   Hematologic/lymphatic: negative for bleeding and easy bruising   Musculoskeletal:negative for arthralgias and myalgias   Neurological: negative for dizziness and weakness           Physical Exam  Blood pressure 113/72, pulse 83, temperature 98.1 °F (36.7 °C), temperature source Oral, resp. rate 18, height 5' 1\" (1.549 m), weight 293 lb 8 oz (133.1 kg), last menstrual period 03/02/2022, SpO2 94 %. General appearance: alert, cooperative, no distress, appears stated age  Eyes: Anicteric  Head: Normocephalic, without obvious abnormality  Lungs: clear to auscultation bilaterally, Normal Effort  Heart: regular rate and rhythm, normal S1 and S2, no murmurs or rubs  Abdomen: soft, minimal RUQ tenderness. No guarding or rebound. Bowel sounds normal. No masses,  no organomegaly. Extremities: atraumatic, no cyanosis or edema  Skin: warm and dry, no jaundice  Neuro: Grossly intact, A&OX3  Musculoskeletal: 5/5  strength BUE      Data Review:    Recent Labs     03/21/22 1946 03/22/22  0604   WBC 6.0 6.1   HGB 14.4 13.3   HCT 42.8 39.6   MCV 88.7 89.7    193     Recent Labs     03/21/22 1946 03/22/22  0604   * 136   K 4.4 3.7   CL 96* 101   CO2 23 24   PHOS  --  3.3   BUN 11 9   CREATININE <0.5* 0.5*     Recent Labs     03/21/22 1946 03/22/22  0604   * 171*   * 328*   BILITOT 6.3* 6.4*   ALKPHOS 281* 261*     Recent Labs     03/21/22 1946   LIPASE 25.0     No results for input(s): PROTIME, INR in the last 72 hours. No results for input(s): PTT in the last 72 hours. No results for input(s): OCCULTBLD in the last 72 hours. Results for Melissa Guerra (MRN [id number]) as of 3/22/2022 09:46   Ref.  Range 3/21/2022 19:46   Hep A IgM Latest Ref Range: Non-reactive Non-reactive   Hep B S Ag Interp Latest Ref Range: Non-reactive  Non-reactive   Hep C Ab Interp Latest Ref Range: Non-reactive  Non-reactive   Hep B Core Ab, IgM Latest Ref Range: Non-reactive  Non-reactive   Imaging Studies:                 CT-scan of abdomen and pelvis w iv contrast 3/21/22:  Impression   1.  Cholelithiasis and mild biliary dilatation.  The possibilities of   choledocholithiasis and cholecystitis should be considered.  Consider   follow-up with MRCP.       2.  Clustered nodular opacities in the medial left lung base measure up to 12   mm.  Question vascular association.  Nonemergent follow-up with   contrast-enhanced chest CT is recommended, if not previously performed.  This   could also be assessed on contrast-enhanced MRCP. Assessment:     Principal Problem:    Choledocholithiasis  Resolved Problems:    * No resolved hospital problems. *    Elevated liver enzymes, biliary dilation -concerning for CBD stone. Afebrile. No leukocytosis. No signs of cholangitis. Cholelithiasis -surgery consulted. Recommendations:   -N.p.o. except sips of clears as tolerated today  -N.p.o. midnight  -Monitor liver enzymes  - zosyn  -plan ERCP tomorrow    Discussed with Dr. Efrain Covarrubias, PA-C  [address]    I have seen and examined this patient and agree with the assessment and plan as outlined by the nurse practitioner or physician assistant. Assessment: Jaundice and upper abdominal discomfort-CT scan shows common bile duct dilation as well as gallstones. No evidence of ascending cholangitis at this point. LFTs elevated in an obstructive pattern. Plan: N.p.o., IV fluids and supportive care. Follow labs and vitals closely. We will plan on ERCP likely tomorrow morning due to scheduling issues.

## 2022-03-22 NOTE — CONSULTS
PULMONARY AND CRITICAL CARE CONSULTATION NOTE    CONSULTING PHYSICIAN:      REASON FOR CONSULT:   Chief Complaint   Patient presents with    Dizziness     abdominal pain 3 days ago that has since resolved. Yellowing in both eyes since this morning. Dizziness and headache x 1 day. Denies pain, 1 episode of vomiting last night. DATE OF CONSULT: 3/22/2022    HISTORY OF PRESENT ILLNESS: 49y.o. year old female with no significant past medical history, current everyday smoker who presented to hospital with complaints of abdominal pain. Diagnosed with chronic cholecystitis and choledocholithiasis. She underwent CT abdomen and chest that showed nodular opacities in the medial left lower lobe with a possibility of associated vessel. I personally reviewed and interpreted the images. Patient denies any shortness of breath or cough or wheezing or chest pain or hemoptysis. She denies any weight loss. Denies any family history of lung cancer. Denies any previous history of cancer. She has extensive smoking history of at least 20 pack years and continues to smoke 1 pack/day. REVIEW OF SYSTEMS:   CONSTITUTIONAL SYMPTOMS: The patient denies fever, fatigue, night sweats, weight loss or weight gain. HEENT: No vision changes. No tinnitus, Denies sinus pain. No hoarseness, or dysphagia. NECK: Patient denies swelling in the neck. CARDIOVASCULAR: Denies chest pain, palpitation, syncope. RESPIRATORY: Denies shortness of breath or cough. GASTROINTESTINAL: Denies nausea, abdominal pain or change in bowel function. GENITOURINARY: Denies obstructive symptoms. No history of incontinence. BREASTS: No masses or lumps in the breasts. SKIN: No rashes or itching. MUSCULOSKELETAL: Denies weakness or bone pain. NEUROLOGICAL: No headaches or seizures. PSYCHIATRIC: Denies mood swings or depression.    ENDOCRINE: Denies heat or cold intolerance or excessive thirst.  HEMATOLOGIC/LYMPHATIC: Denies easy bruising or lymph node swelling. ALLERGIC/IMMUNOLOGIC: No environmental allergies. PAST MEDICAL HISTORY: History reviewed. No pertinent past medical history. PAST SURGICAL HISTORY:   Past Surgical History:   Procedure Laterality Date     SECTION         SOCIAL HISTORY:   Social History     Tobacco Use    Smoking status: Current Every Day Smoker     Packs/day: 1.00     Years: 10.00     Pack years: 10.00    Smokeless tobacco: Never Used   Substance Use Topics    Alcohol use: Yes     Comment: occas    Drug use: Not on file       FAMILY HISTORY: History reviewed. No pertinent family history. MEDICATIONS:     No current facility-administered medications on file prior to encounter. Current Outpatient Medications on File Prior to Encounter   Medication Sig Dispense Refill    albuterol (PROVENTIL HFA) 108 (90 BASE) MCG/ACT inhaler Inhale 2 puffs into the lungs every 6 hours as needed for Wheezing. (Patient not taking: Reported on 3/22/2022) 1 Inhaler 3        sodium chloride flush  5-40 mL IntraVENous 2 times per day    [START ON 3/23/2022] enoxaparin  40 mg SubCUTAneous Daily    pantoprazole  40 mg Oral QAM AC    albuterol sulfate HFA  2 puff Inhalation BID    piperacillin-tazobactam  3,375 mg IntraVENous Q8H      lactated ringers 100 mL/hr at 22 0301    sodium chloride       albuterol sulfate HFA, oxyCODONE, sodium chloride flush, sodium chloride, ondansetron **OR** ondansetron, polyethylene glycol, labetalol, calcium carbonate, ketorolac    ALLERGIES:   Allergies as of 2022    (No Known Allergies)      OBJECTIVE:   height is 5' 1\" (1.549 m) and weight is 293 lb 8 oz (133.1 kg). Her axillary temperature is 97.8 °F (36.6 °C). Her blood pressure is 130/79 and her pulse is 76. Her respiration is 18 and oxygen saturation is 95%. I/O this shift:  In: -   Out: 200 [Urine:200]     PHYSICAL EXAM:  CONSTITUTIONAL: She is a 49y.o.-year-old who appears her stated age.  She is alert and oriented x 3 and in no acute distress. HEENT: PERRL. No scleral icterus. No thrush, atraumatic, normocephalic. NECK: Supple, without cervical or supraclavicular lymphadenopathy:  CARDIOVASCULAR: S1 S2 RRR. Without murmer  RESPIRATORY & CHEST: Lungs are clear to auscultation and percussion. No wheezing, no crackles. Good air movement  GASTROINTESTINAL & ABDOMEN: Soft, nontender, positive bowel sounds in all quadrants, non-distended, without hepatosplenomegaly. GENITOURINARY: Deferred. MUSCULOSKELETAL: No tenderness to palpation of the axial skeleton. There is no clubbing. No cyanosis. No edema of the lower extremities. SKIN OF BODY: No rash or jaundice. PSYCHIATRIC EVALUATION: Normal affect. Patient answers questions appropriately. HEMATOLOGIC/LYMPHATIC/ IMMUNOLOGIC: No palpable lymphadenopathy. NEUROLOGIC: Alert and oriented x 3. Groslly non-focal. Motor strength is 5+/5 in all muscle groups. The patient has a normal sensorium globally. LABS:  Lab Results   Component Value Date    WBC 6.1 03/22/2022    HGB 13.3 03/22/2022    HCT 39.6 03/22/2022     03/22/2022     (H) 03/22/2022     (H) 03/22/2022     03/22/2022    K 3.7 03/22/2022     03/22/2022    CREATININE 0.5 (L) 03/22/2022    BUN 9 03/22/2022    CO2 24 03/22/2022       Lab Results   Component Value Date    GLUCOSE 101 (H) 03/22/2022    CALCIUM 9.4 03/22/2022     03/22/2022    K 3.7 03/22/2022    CO2 24 03/22/2022     03/22/2022    BUN 9 03/22/2022    CREATININE 0.5 (L) 03/22/2022       IMPRESSION:   Left lower lobe nodular opacity  Current everyday smoker  Choledocholithiasis  Chronic cholecystitis      RECOMMENDATION:   Patient was noted to have left lower lobe medial nodular opacity on CT chest.  It probably has an associated vessel. Vascular anomaly cannot be completely ruled out. However, with her history of smoking of at least 20 pack years, malignancy cannot be completely ruled out.   I would recommend short-term CT chest with contrast in 4 weeks. Patient will follow up with pulmonary as outpatient. Angel Lund MD  Pulmonary Critical Care and Sleep Medicine  3/22/2022, 2:14 PM    This note was completed using dragon medical speech recognition software. Grammatical errors, random word insertions, pronoun errors and incomplete sentences are occasional consequences of this technology due to software limitations. If there are questions or concerns about the content of this note of information contained within the body of this dictation they should be addressed with the provider for clarification.
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
## 2022-03-22 NOTE — CARE COORDINATION
Discharge Planning Note:      Chart reviewed and it appears that patient has minimal needs for discharge at this time. Discussed with patient and requested that case management be notified if discharge needs are identified.     - Current discharge plan is for the patient to return home with no needs. Case management will continue to follow progress and update discharge plan as needed.     Risk of Readmission Score: 6%    BELKIS Gaspar RN      Phone: [phone]

## 2022-03-22 NOTE — PROGRESS NOTES
03/22/22 0354   RT Protocol   History Pulmonary Disease 0   Respiratory pattern 0   Breath sounds 6   Cough 0   Bronchodilator Assessment Score 6

## 2022-03-23 ENCOUNTER — ANESTHESIA (OUTPATIENT)
Dept: ENDOSCOPY | Age: 52
DRG: 418 | End: 2022-03-23

## 2022-03-23 ENCOUNTER — ANESTHESIA (OUTPATIENT)
Dept: OPERATING ROOM | Age: 52
DRG: 418 | End: 2022-03-23

## 2022-03-23 ENCOUNTER — APPOINTMENT (OUTPATIENT)
Dept: GENERAL RADIOLOGY | Age: 52
DRG: 418 | End: 2022-03-23

## 2022-03-23 VITALS
RESPIRATION RATE: 23 BRPM | OXYGEN SATURATION: 99 % | DIASTOLIC BLOOD PRESSURE: 53 MMHG | SYSTOLIC BLOOD PRESSURE: 116 MMHG

## 2022-03-23 LAB
A/G RATIO: 1.8 (ref 1.1–2.2)
ALBUMIN SERPL-MCNC: 3.6 G/DL (ref 3.4–5)
ALP BLD-CCNC: 253 U/L (ref 40–129)
ALT SERPL-CCNC: 319 U/L (ref 10–40)
ANION GAP SERPL CALCULATED.3IONS-SCNC: 12 MMOL/L (ref 3–16)
AST SERPL-CCNC: 202 U/L (ref 15–37)
BASOPHILS ABSOLUTE: 0.1 K/UL (ref 0–0.2)
BASOPHILS RELATIVE PERCENT: 1.3 %
BILIRUB SERPL-MCNC: 6 MG/DL (ref 0–1)
BUN BLDV-MCNC: 9 MG/DL (ref 7–20)
CALCIUM SERPL-MCNC: 8.9 MG/DL (ref 8.3–10.6)
CHLORIDE BLD-SCNC: 104 MMOL/L (ref 99–110)
CO2: 23 MMOL/L (ref 21–32)
CREAT SERPL-MCNC: 0.6 MG/DL (ref 0.6–1.1)
EOSINOPHILS ABSOLUTE: 0.3 K/UL (ref 0–0.6)
EOSINOPHILS RELATIVE PERCENT: 5.7 %
GFR AFRICAN AMERICAN: >60
GFR NON-AFRICAN AMERICAN: >60
GLUCOSE BLD-MCNC: 93 MG/DL (ref 70–99)
GONADOTROPIN, CHORIONIC (HCG) QUANT: <5 MIU/ML
HCT VFR BLD CALC: 38.3 % (ref 36–48)
HEMOGLOBIN: 12.6 G/DL (ref 12–16)
INR BLD: 0.93 (ref 0.88–1.12)
LYMPHOCYTES ABSOLUTE: 0.9 K/UL (ref 1–5.1)
LYMPHOCYTES RELATIVE PERCENT: 21.3 %
MAGNESIUM: 2 MG/DL (ref 1.8–2.4)
MCH RBC QN AUTO: 29.6 PG (ref 26–34)
MCHC RBC AUTO-ENTMCNC: 33 G/DL (ref 31–36)
MCV RBC AUTO: 89.9 FL (ref 80–100)
MONOCYTES ABSOLUTE: 0.5 K/UL (ref 0–1.3)
MONOCYTES RELATIVE PERCENT: 10.9 %
NEUTROPHILS ABSOLUTE: 2.7 K/UL (ref 1.7–7.7)
NEUTROPHILS RELATIVE PERCENT: 60.8 %
PDW BLD-RTO: 14.5 % (ref 12.4–15.4)
PHOSPHORUS: 2.9 MG/DL (ref 2.5–4.9)
PLATELET # BLD: 185 K/UL (ref 135–450)
PMV BLD AUTO: 8.2 FL (ref 5–10.5)
POTASSIUM SERPL-SCNC: 3.8 MMOL/L (ref 3.5–5.1)
PROTHROMBIN TIME: 10.5 SEC (ref 9.9–12.7)
RBC # BLD: 4.26 M/UL (ref 4–5.2)
SODIUM BLD-SCNC: 139 MMOL/L (ref 136–145)
TOTAL PROTEIN: 5.6 G/DL (ref 6.4–8.2)
WBC # BLD: 4.4 K/UL (ref 4–11)

## 2022-03-23 PROCEDURE — 2580000003 HC RX 258: Performed by: INTERNAL MEDICINE

## 2022-03-23 PROCEDURE — 74330 X-RAY BILE/PANC ENDOSCOPY: CPT

## 2022-03-23 PROCEDURE — 6360000002 HC RX W HCPCS: Performed by: PHYSICIAN ASSISTANT

## 2022-03-23 PROCEDURE — 80053 COMPREHEN METABOLIC PANEL: CPT

## 2022-03-23 PROCEDURE — 7100000000 HC PACU RECOVERY - FIRST 15 MIN: Performed by: INTERNAL MEDICINE

## 2022-03-23 PROCEDURE — 6360000004 HC RX CONTRAST MEDICATION: Performed by: INTERNAL MEDICINE

## 2022-03-23 PROCEDURE — 6370000000 HC RX 637 (ALT 250 FOR IP): Performed by: REGISTERED NURSE

## 2022-03-23 PROCEDURE — 84702 CHORIONIC GONADOTROPIN TEST: CPT

## 2022-03-23 PROCEDURE — 94760 N-INVAS EAR/PLS OXIMETRY 1: CPT

## 2022-03-23 PROCEDURE — 83735 ASSAY OF MAGNESIUM: CPT

## 2022-03-23 PROCEDURE — 3609014900 HC ERCP W/SPHINCTEROTOMY &/OR PAPILLOTOMY: Performed by: INTERNAL MEDICINE

## 2022-03-23 PROCEDURE — APPSS15 APP SPLIT SHARED TIME 0-15 MINUTES: Performed by: NURSE PRACTITIONER

## 2022-03-23 PROCEDURE — 84100 ASSAY OF PHOSPHORUS: CPT

## 2022-03-23 PROCEDURE — 3700000000 HC ANESTHESIA ATTENDED CARE: Performed by: INTERNAL MEDICINE

## 2022-03-23 PROCEDURE — 7100000001 HC PACU RECOVERY - ADDTL 15 MIN: Performed by: INTERNAL MEDICINE

## 2022-03-23 PROCEDURE — 3609015200 HC ERCP REMOVE CALCULI/DEBRIS BILIARY/PANCREAS DUCT: Performed by: INTERNAL MEDICINE

## 2022-03-23 PROCEDURE — 85610 PROTHROMBIN TIME: CPT

## 2022-03-23 PROCEDURE — 2580000003 HC RX 258: Performed by: PHYSICIAN ASSISTANT

## 2022-03-23 PROCEDURE — 1200000000 HC SEMI PRIVATE

## 2022-03-23 PROCEDURE — 2580000003 HC RX 258: Performed by: REGISTERED NURSE

## 2022-03-23 PROCEDURE — 94640 AIRWAY INHALATION TREATMENT: CPT

## 2022-03-23 PROCEDURE — APPNB30 APP NON BILLABLE TIME 0-30 MINS: Performed by: NURSE PRACTITIONER

## 2022-03-23 PROCEDURE — 6370000000 HC RX 637 (ALT 250 FOR IP)

## 2022-03-23 PROCEDURE — 6360000002 HC RX W HCPCS: Performed by: INTERNAL MEDICINE

## 2022-03-23 PROCEDURE — 99232 SBSQ HOSP IP/OBS MODERATE 35: CPT | Performed by: SURGERY

## 2022-03-23 PROCEDURE — 6370000000 HC RX 637 (ALT 250 FOR IP): Performed by: PHYSICIAN ASSISTANT

## 2022-03-23 PROCEDURE — 2500000003 HC RX 250 WO HCPCS: Performed by: REGISTERED NURSE

## 2022-03-23 PROCEDURE — 85025 COMPLETE CBC W/AUTO DIFF WBC: CPT

## 2022-03-23 PROCEDURE — 3700000001 HC ADD 15 MINUTES (ANESTHESIA): Performed by: INTERNAL MEDICINE

## 2022-03-23 PROCEDURE — 6360000002 HC RX W HCPCS: Performed by: REGISTERED NURSE

## 2022-03-23 PROCEDURE — 2709999900 HC NON-CHARGEABLE SUPPLY: Performed by: INTERNAL MEDICINE

## 2022-03-23 PROCEDURE — 2720000010 HC SURG SUPPLY STERILE: Performed by: INTERNAL MEDICINE

## 2022-03-23 PROCEDURE — 94669 MECHANICAL CHEST WALL OSCILL: CPT

## 2022-03-23 RX ORDER — PROPOFOL 10 MG/ML
INJECTION, EMULSION INTRAVENOUS PRN
Status: DISCONTINUED | OUTPATIENT
Start: 2022-03-23 | End: 2022-03-23 | Stop reason: SDUPTHER

## 2022-03-23 RX ORDER — ACETAMINOPHEN, ASPIRIN AND CAFFEINE 250; 250; 65 MG/1; MG/1; MG/1
1 TABLET, FILM COATED ORAL EVERY 6 HOURS PRN
Status: DISCONTINUED | OUTPATIENT
Start: 2022-03-23 | End: 2022-03-26 | Stop reason: HOSPADM

## 2022-03-23 RX ORDER — HYDROMORPHONE HCL 110MG/55ML
0.25 PATIENT CONTROLLED ANALGESIA SYRINGE INTRAVENOUS EVERY 5 MIN PRN
Status: DISCONTINUED | OUTPATIENT
Start: 2022-03-23 | End: 2022-03-23 | Stop reason: HOSPADM

## 2022-03-23 RX ORDER — LISINOPRIL 20 MG/1
20 TABLET ORAL DAILY
Status: DISCONTINUED | OUTPATIENT
Start: 2022-03-23 | End: 2022-03-24

## 2022-03-23 RX ORDER — ONDANSETRON 2 MG/ML
INJECTION INTRAMUSCULAR; INTRAVENOUS PRN
Status: DISCONTINUED | OUTPATIENT
Start: 2022-03-23 | End: 2022-03-23 | Stop reason: SDUPTHER

## 2022-03-23 RX ORDER — ROCURONIUM BROMIDE 10 MG/ML
INJECTION, SOLUTION INTRAVENOUS PRN
Status: DISCONTINUED | OUTPATIENT
Start: 2022-03-23 | End: 2022-03-23 | Stop reason: SDUPTHER

## 2022-03-23 RX ORDER — SODIUM CHLORIDE 0.9 % (FLUSH) 0.9 %
5-40 SYRINGE (ML) INJECTION PRN
Status: DISCONTINUED | OUTPATIENT
Start: 2022-03-23 | End: 2022-03-23 | Stop reason: HOSPADM

## 2022-03-23 RX ORDER — LIDOCAINE HYDROCHLORIDE 20 MG/ML
INJECTION, SOLUTION EPIDURAL; INFILTRATION; INTRACAUDAL; PERINEURAL PRN
Status: DISCONTINUED | OUTPATIENT
Start: 2022-03-23 | End: 2022-03-23 | Stop reason: SDUPTHER

## 2022-03-23 RX ORDER — ALBUTEROL SULFATE 90 UG/1
AEROSOL, METERED RESPIRATORY (INHALATION) PRN
Status: DISCONTINUED | OUTPATIENT
Start: 2022-03-23 | End: 2022-03-23 | Stop reason: SDUPTHER

## 2022-03-23 RX ORDER — GLYCOPYRROLATE 1 MG/5 ML
SYRINGE (ML) INTRAVENOUS PRN
Status: DISCONTINUED | OUTPATIENT
Start: 2022-03-23 | End: 2022-03-23 | Stop reason: SDUPTHER

## 2022-03-23 RX ORDER — DEXAMETHASONE SODIUM PHOSPHATE 4 MG/ML
INJECTION, SOLUTION INTRA-ARTICULAR; INTRALESIONAL; INTRAMUSCULAR; INTRAVENOUS; SOFT TISSUE PRN
Status: DISCONTINUED | OUTPATIENT
Start: 2022-03-23 | End: 2022-03-23 | Stop reason: SDUPTHER

## 2022-03-23 RX ORDER — FENTANYL CITRATE 50 UG/ML
INJECTION, SOLUTION INTRAMUSCULAR; INTRAVENOUS PRN
Status: DISCONTINUED | OUTPATIENT
Start: 2022-03-23 | End: 2022-03-23 | Stop reason: SDUPTHER

## 2022-03-23 RX ORDER — HYDROMORPHONE HCL 110MG/55ML
0.5 PATIENT CONTROLLED ANALGESIA SYRINGE INTRAVENOUS EVERY 5 MIN PRN
Status: DISCONTINUED | OUTPATIENT
Start: 2022-03-23 | End: 2022-03-23 | Stop reason: HOSPADM

## 2022-03-23 RX ORDER — ONDANSETRON 2 MG/ML
4 INJECTION INTRAMUSCULAR; INTRAVENOUS
Status: DISCONTINUED | OUTPATIENT
Start: 2022-03-23 | End: 2022-03-23 | Stop reason: HOSPADM

## 2022-03-23 RX ORDER — SODIUM CHLORIDE 0.9 % (FLUSH) 0.9 %
5-40 SYRINGE (ML) INJECTION EVERY 12 HOURS SCHEDULED
Status: DISCONTINUED | OUTPATIENT
Start: 2022-03-23 | End: 2022-03-23 | Stop reason: HOSPADM

## 2022-03-23 RX ORDER — SUCCINYLCHOLINE/SOD CL,ISO/PF 200MG/10ML
SYRINGE (ML) INTRAVENOUS PRN
Status: DISCONTINUED | OUTPATIENT
Start: 2022-03-23 | End: 2022-03-23 | Stop reason: SDUPTHER

## 2022-03-23 RX ORDER — HYDROMORPHONE HCL 110MG/55ML
1 PATIENT CONTROLLED ANALGESIA SYRINGE INTRAVENOUS
Status: DISCONTINUED | OUTPATIENT
Start: 2022-03-23 | End: 2022-03-26 | Stop reason: HOSPADM

## 2022-03-23 RX ORDER — IPRATROPIUM BROMIDE AND ALBUTEROL SULFATE 2.5; .5 MG/3ML; MG/3ML
SOLUTION RESPIRATORY (INHALATION)
Status: COMPLETED
Start: 2022-03-23 | End: 2022-03-23

## 2022-03-23 RX ORDER — SODIUM CHLORIDE, SODIUM LACTATE, POTASSIUM CHLORIDE, CALCIUM CHLORIDE 600; 310; 30; 20 MG/100ML; MG/100ML; MG/100ML; MG/100ML
INJECTION, SOLUTION INTRAVENOUS CONTINUOUS PRN
Status: DISCONTINUED | OUTPATIENT
Start: 2022-03-23 | End: 2022-03-23 | Stop reason: SDUPTHER

## 2022-03-23 RX ORDER — SODIUM CHLORIDE 9 MG/ML
25 INJECTION, SOLUTION INTRAVENOUS PRN
Status: DISCONTINUED | OUTPATIENT
Start: 2022-03-23 | End: 2022-03-23 | Stop reason: HOSPADM

## 2022-03-23 RX ADMIN — DEXAMETHASONE SODIUM PHOSPHATE 8 MG: 4 INJECTION, SOLUTION INTRAMUSCULAR; INTRAVENOUS at 09:30

## 2022-03-23 RX ADMIN — Medication 180 MG: at 09:18

## 2022-03-23 RX ADMIN — Medication 0.2 MG: at 09:30

## 2022-03-23 RX ADMIN — LIDOCAINE HYDROCHLORIDE 100 MG: 20 INJECTION, SOLUTION EPIDURAL; INFILTRATION; INTRACAUDAL; PERINEURAL at 09:18

## 2022-03-23 RX ADMIN — Medication 2 PUFF: at 19:40

## 2022-03-23 RX ADMIN — SODIUM CHLORIDE 1000 ML: 9 INJECTION, SOLUTION INTRAVENOUS at 08:49

## 2022-03-23 RX ADMIN — ONDANSETRON 4 MG: 2 INJECTION INTRAMUSCULAR; INTRAVENOUS at 09:30

## 2022-03-23 RX ADMIN — ALBUTEROL SULFATE 8 PUFF: 90 AEROSOL, METERED RESPIRATORY (INHALATION) at 09:24

## 2022-03-23 RX ADMIN — Medication 2 PUFF: at 13:32

## 2022-03-23 RX ADMIN — ROCURONIUM BROMIDE 30 MG: 10 INJECTION, SOLUTION INTRAVENOUS at 09:30

## 2022-03-23 RX ADMIN — PIPERACILLIN AND TAZOBACTAM 3375 MG: 3; .375 INJECTION, POWDER, LYOPHILIZED, FOR SOLUTION INTRAVENOUS at 03:21

## 2022-03-23 RX ADMIN — SODIUM CHLORIDE, POTASSIUM CHLORIDE, SODIUM LACTATE AND CALCIUM CHLORIDE: 600; 310; 30; 20 INJECTION, SOLUTION INTRAVENOUS at 01:23

## 2022-03-23 RX ADMIN — IPRATROPIUM BROMIDE AND ALBUTEROL SULFATE 3 ML: .5; 3 SOLUTION RESPIRATORY (INHALATION) at 10:00

## 2022-03-23 RX ADMIN — SUGAMMADEX 300 MG: 100 INJECTION, SOLUTION INTRAVENOUS at 09:51

## 2022-03-23 RX ADMIN — SODIUM CHLORIDE, POTASSIUM CHLORIDE, SODIUM LACTATE AND CALCIUM CHLORIDE: 600; 310; 30; 20 INJECTION, SOLUTION INTRAVENOUS at 15:25

## 2022-03-23 RX ADMIN — PIPERACILLIN AND TAZOBACTAM 3375 MG: 3; .375 INJECTION, POWDER, LYOPHILIZED, FOR SOLUTION INTRAVENOUS at 20:09

## 2022-03-23 RX ADMIN — PIPERACILLIN AND TAZOBACTAM 3375 MG: 3; .375 INJECTION, POWDER, LYOPHILIZED, FOR SOLUTION INTRAVENOUS at 11:53

## 2022-03-23 RX ADMIN — FENTANYL CITRATE 50 MCG: 50 INJECTION, SOLUTION INTRAMUSCULAR; INTRAVENOUS at 09:18

## 2022-03-23 RX ADMIN — KETOROLAC TROMETHAMINE 15 MG: 15 INJECTION, SOLUTION INTRAMUSCULAR; INTRAVENOUS at 11:54

## 2022-03-23 RX ADMIN — ALBUTEROL SULFATE 8 PUFF: 90 AEROSOL, METERED RESPIRATORY (INHALATION) at 09:49

## 2022-03-23 RX ADMIN — ACETAMINOPHEN, ASPIRIN, CAFFEINE 1 TABLET: 250; 65; 250 TABLET, FILM COATED ORAL at 15:22

## 2022-03-23 RX ADMIN — PROPOFOL 200 MG: 10 INJECTION, EMULSION INTRAVENOUS at 09:18

## 2022-03-23 RX ADMIN — SODIUM CHLORIDE, POTASSIUM CHLORIDE, SODIUM LACTATE AND CALCIUM CHLORIDE: 600; 310; 30; 20 INJECTION, SOLUTION INTRAVENOUS at 09:15

## 2022-03-23 ASSESSMENT — PULMONARY FUNCTION TESTS
PIF_VALUE: 28
PIF_VALUE: 4
PIF_VALUE: 7
PIF_VALUE: 16
PIF_VALUE: 7
PIF_VALUE: 32
PIF_VALUE: 27
PIF_VALUE: 26
PIF_VALUE: 2
PIF_VALUE: 26
PIF_VALUE: 33
PIF_VALUE: 27
PIF_VALUE: 3
PIF_VALUE: 1
PIF_VALUE: 0
PIF_VALUE: 26
PIF_VALUE: 2
PIF_VALUE: 16
PIF_VALUE: 31
PIF_VALUE: 26
PIF_VALUE: 26
PIF_VALUE: 35
PIF_VALUE: 26
PIF_VALUE: 26
PIF_VALUE: 34
PIF_VALUE: 23
PIF_VALUE: 23
PIF_VALUE: 26
PIF_VALUE: 27
PIF_VALUE: 26
PIF_VALUE: 28
PIF_VALUE: 36
PIF_VALUE: 32
PIF_VALUE: 35
PIF_VALUE: 36
PIF_VALUE: 18

## 2022-03-23 ASSESSMENT — PAIN SCALES - GENERAL
PAINLEVEL_OUTOF10: 0
PAINLEVEL_OUTOF10: 1
PAINLEVEL_OUTOF10: 0
PAINLEVEL_OUTOF10: 7
PAINLEVEL_OUTOF10: 7
PAINLEVEL_OUTOF10: 0

## 2022-03-23 ASSESSMENT — PAIN - FUNCTIONAL ASSESSMENT: PAIN_FUNCTIONAL_ASSESSMENT: 0-10

## 2022-03-23 ASSESSMENT — PAIN DESCRIPTION - ORIENTATION: ORIENTATION: LEFT;RIGHT

## 2022-03-23 ASSESSMENT — PAIN DESCRIPTION - FREQUENCY: FREQUENCY: INTERMITTENT

## 2022-03-23 ASSESSMENT — PAIN DESCRIPTION - DESCRIPTORS: DESCRIPTORS: CRAMPING

## 2022-03-23 ASSESSMENT — PAIN DESCRIPTION - LOCATION: LOCATION: ABDOMEN

## 2022-03-23 ASSESSMENT — PAIN DESCRIPTION - ONSET: ONSET: OTHER (COMMENT)

## 2022-03-23 NOTE — PROGRESS NOTES
PM assessment completed. Pt resting well in bed with no c/o pain or discomfort made. No needs voiced. Spouse at bedside. Fall precautions in place, hourly rounding, call light and belongings in reach, bed in lowest position, wheels locked in place, side rails up x 2, walkways free of clutter. Patient remains independent in room. The care plan and education has been reviewed and mutually agreed upon with the patient.

## 2022-03-23 NOTE — PROGRESS NOTES
Adm to pacu bay # 8 from endo per cart awakening. Respirations symmetrical. HHN treatment with duoneb started per order of Dr Desirae Solorzano. Coughing nonproductive. Abdomen soft. Denies pain. VSS. Report received from endo staff.

## 2022-03-23 NOTE — PROGRESS NOTES
Awake alert & oriented. Respirations symmetrical. Nonproductive cough. Abdomen soft. Denies pain. Transferred per Qi Dawn RN to floor.

## 2022-03-23 NOTE — PROGRESS NOTES
Incentive Spirometry education and demonstration completed by Respiratory Therapy Yes      Response to education: Very Good     Teaching Time: 5 minutes    Minimum Predicted Vital Capacity - 505 mL. Patient's Actual Vital Capacity - 1000 mL. Turning over to Nursing for routine follow-up Yes.     Comments: continue spirometer q2h w/a    Electronically signed by Erin Armenta RCP on 3/23/2022 at 2:02 PM

## 2022-03-23 NOTE — PROGRESS NOTES
Assessment complete. Pt. Denies c/o pain or nausea. Denies any immediate needs att this time. VSS. Shilpa-operative checklist completed. In gown, gripper socks on. Voided. Transport here to pick-up patient. Assisted to w/c, disconnected from IV per Endo request. The care plan and education has been reviewed and mutually agreed upon with the patient.

## 2022-03-23 NOTE — PROGRESS NOTES
[facility]    3/23/2022 3:15 PM        Name: Morenita Kay . Admitted: 3/21/2022  Primary Care Provider: No primary care provider on file. (Tel: None)      Subjective:  . Patient seen this afternoon. Had ERCP this am. Feeling ok. Has a headache but otherwise abdominal pain improved.      Reviewed interval ancillary notes    Current Medications  HYDROmorphone (DILAUDID) injection 1 mg, Q2H PRN  aspirin-acetaminophen-caffeine (EXCEDRIN MIGRAINE) per tablet 1 tablet, Q6H PRN  albuterol sulfate  (90 Base) MCG/ACT inhaler 2 puff, Q4H PRN  lactated ringers infusion, Continuous  oxyCODONE (ROXICODONE) immediate release tablet 5 mg, Q4H PRN  sodium chloride flush 0.9 % injection 5-40 mL, 2 times per day  sodium chloride flush 0.9 % injection 10 mL, PRN  0.9 % sodium chloride infusion, PRN  enoxaparin (LOVENOX) injection 40 mg, Daily  ondansetron (ZOFRAN-ODT) disintegrating tablet 4 mg, Q8H PRN   Or  ondansetron (ZOFRAN) injection 4 mg, Q6H PRN  polyethylene glycol (GLYCOLAX) packet 17 g, Daily PRN  labetalol (NORMODYNE;TRANDATE) injection 10 mg, Q4H PRN  pantoprazole (PROTONIX) tablet 40 mg, QAM AC  calcium carbonate (TUMS) chewable tablet 500 mg, TID PRN  albuterol sulfate  (90 Base) MCG/ACT inhaler 2 puff, BID  piperacillin-tazobactam (ZOSYN) 3,375 mg in dextrose 5 % 50 mL IVPB extended infusion (mini-bag), Q8H  ketorolac (TORADOL) injection 15 mg, Q6H PRN        Objective:  BP (!) 164/74   Pulse 91   Temp 97.5 °F (36.4 °C) (Oral)   Resp 16   Ht 5' 1\" (1.549 m)   Wt 293 lb (132.9 kg)   LMP 03/02/2022 (Approximate)   SpO2 95%   BMI 55.36 kg/m²     Intake/Output Summary (Last 24 hours) at 3/23/2022 1515  Last data filed at 3/23/2022 1020  Gross per 24 hour   Intake 560 ml   Output 1050 ml   Net -490 ml      Wt Readings from Last 3 Encounters:   03/23/22 293 lb (132.9 kg)       General appearance:  Appears comfortable  Eyes: Sclera clear. Pupils equal.  ENT: Moist oral mucosa. Trachea midline, no adenopathy. Cardiovascular: Regular rhythm, normal S1, S2. No murmur. No edema in lower extremities  Respiratory: Not using accessory muscles. Good inspiratory effort. Clear to auscultation bilaterally, no wheeze or crackles. GI: Abdomen soft, no tenderness, not distended, normal bowel sounds  Musculoskeletal: No cyanosis in digits, neck supple  Neurology: CN 2-12 grossly intact. No speech or motor deficits  Psych: Normal affect. Alert and oriented in time, place and person  Skin: Warm, dry, normal turgor    Labs and Tests:  CBC:   Recent Labs     03/21/22 1946 03/22/22  0604 03/23/22  0547   WBC 6.0 6.1 4.4   HGB 14.4 13.3 12.6    193 185     BMP:    Recent Labs     03/21/22 1946 03/22/22  0604 03/23/22  0547   * 136 139   K 4.4 3.7 3.8   CL 96* 101 104   CO2 23 24 23   BUN 11 9 9   CREATININE <0.5* 0.5* 0.6   GLUCOSE 130* 101* 93     Hepatic:   Recent Labs     03/21/22 1946 03/22/22  0604 03/23/22  0547   * 171* 202*   * 328* 319*   BILITOT 6.3* 6.4* 6.0*   ALKPHOS 281* 261* 253*       Problem List  Principal Problem:    Choledocholithiasis  Resolved Problems:    * No resolved hospital problems. *       Assessment & Plan:   1. Choledocholithiasis-had ERCP this am. Had sludge and small stones. CBD patent at end of procedure. Going for lap juany in the next day or so. Decrease iv fluids. Start clear liquids. NPO at midnight. Continue zosyn for now. 2. Headache-maybe caffeine withdrawal. Has toradol. Try dose of excedrin. 3. Hypertension-has prn labetalol. Will start lisinopril. Decrease fluids. Diet: Diet NPO Exceptions are: Sips of Water with Meds  ADULT DIET;  Clear Liquid  Code:Full Code        Farzaneh Poon PA-C   3/23/2022 3:15 PM

## 2022-03-23 NOTE — ANESTHESIA POSTPROCEDURE EVALUATION
Department of Anesthesiology  Postprocedure Note    Patient: Gaurav Chapa  MRN: [id number]  YOB: 1970  Date of evaluation: 3/23/2022  Time:  10:18 AM     Procedure Summary     Date: 03/23/22 Room / Location: [address] [place] [address]    Anesthesia Start: 0915 Anesthesia Stop: 1003    Procedures:       ERCP SPHINCTER/PAPILLOTOMY (N/A )      ERCP STONE REMOVAL (N/A ) Diagnosis: (Common Bile Duct Stone)    Surgeons: Yury Lutz MD Responsible Provider: Isaiah Eng MD    Anesthesia Type: general ASA Status: 3          Anesthesia Type: general    Nikita Phase I: Nikita Score: 8    Nikita Phase II:      Last vitals: Reviewed and per EMR flowsheets.        Anesthesia Post Evaluation    Patient location during evaluation: PACU  Patient participation: complete - patient participated  Level of consciousness: awake  Airway patency: patent (breathing treatment given )  Nausea & Vomiting: no nausea and no vomiting  Complications: no  Cardiovascular status: blood pressure returned to baseline  Respiratory status: acceptable  Hydration status: stable
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
## 2022-03-24 LAB
A/G RATIO: 1.5 (ref 1.1–2.2)
ALBUMIN SERPL-MCNC: 3.6 G/DL (ref 3.4–5)
ALP BLD-CCNC: 220 U/L (ref 40–129)
ALT SERPL-CCNC: 325 U/L (ref 10–40)
ANION GAP SERPL CALCULATED.3IONS-SCNC: 13 MMOL/L (ref 3–16)
AST SERPL-CCNC: 174 U/L (ref 15–37)
BASOPHILS ABSOLUTE: 0 K/UL (ref 0–0.2)
BASOPHILS RELATIVE PERCENT: 0.3 %
BILIRUB SERPL-MCNC: 2.7 MG/DL (ref 0–1)
BUN BLDV-MCNC: 10 MG/DL (ref 7–20)
CALCIUM SERPL-MCNC: 8.8 MG/DL (ref 8.3–10.6)
CHLORIDE BLD-SCNC: 103 MMOL/L (ref 99–110)
CO2: 24 MMOL/L (ref 21–32)
CREAT SERPL-MCNC: 0.7 MG/DL (ref 0.6–1.1)
EOSINOPHILS ABSOLUTE: 0 K/UL (ref 0–0.6)
EOSINOPHILS RELATIVE PERCENT: 0.1 %
GFR AFRICAN AMERICAN: >60
GFR NON-AFRICAN AMERICAN: >60
GLUCOSE BLD-MCNC: 108 MG/DL (ref 70–99)
HCT VFR BLD CALC: 37.1 % (ref 36–48)
HEMOGLOBIN: 12.2 G/DL (ref 12–16)
LYMPHOCYTES ABSOLUTE: 0.9 K/UL (ref 1–5.1)
LYMPHOCYTES RELATIVE PERCENT: 15.1 %
MAGNESIUM: 1.9 MG/DL (ref 1.8–2.4)
MCH RBC QN AUTO: 29.5 PG (ref 26–34)
MCHC RBC AUTO-ENTMCNC: 32.8 G/DL (ref 31–36)
MCV RBC AUTO: 89.8 FL (ref 80–100)
MONOCYTES ABSOLUTE: 0.4 K/UL (ref 0–1.3)
MONOCYTES RELATIVE PERCENT: 7.4 %
NEUTROPHILS ABSOLUTE: 4.6 K/UL (ref 1.7–7.7)
NEUTROPHILS RELATIVE PERCENT: 77.1 %
PDW BLD-RTO: 14.6 % (ref 12.4–15.4)
PHOSPHORUS: 2.6 MG/DL (ref 2.5–4.9)
PLATELET # BLD: 176 K/UL (ref 135–450)
PMV BLD AUTO: 8.7 FL (ref 5–10.5)
POTASSIUM SERPL-SCNC: 4.1 MMOL/L (ref 3.5–5.1)
RBC # BLD: 4.14 M/UL (ref 4–5.2)
SODIUM BLD-SCNC: 140 MMOL/L (ref 136–145)
TOTAL PROTEIN: 6 G/DL (ref 6.4–8.2)
WBC # BLD: 6 K/UL (ref 4–11)

## 2022-03-24 PROCEDURE — 6360000002 HC RX W HCPCS: Performed by: INTERNAL MEDICINE

## 2022-03-24 PROCEDURE — 2500000003 HC RX 250 WO HCPCS: Performed by: INTERNAL MEDICINE

## 2022-03-24 PROCEDURE — 94669 MECHANICAL CHEST WALL OSCILL: CPT

## 2022-03-24 PROCEDURE — 6370000000 HC RX 637 (ALT 250 FOR IP): Performed by: INTERNAL MEDICINE

## 2022-03-24 PROCEDURE — 2580000003 HC RX 258: Performed by: INTERNAL MEDICINE

## 2022-03-24 PROCEDURE — 99232 SBSQ HOSP IP/OBS MODERATE 35: CPT | Performed by: SURGERY

## 2022-03-24 PROCEDURE — 94761 N-INVAS EAR/PLS OXIMETRY MLT: CPT

## 2022-03-24 PROCEDURE — 6370000000 HC RX 637 (ALT 250 FOR IP): Performed by: PHYSICIAN ASSISTANT

## 2022-03-24 PROCEDURE — 84100 ASSAY OF PHOSPHORUS: CPT

## 2022-03-24 PROCEDURE — APPSS15 APP SPLIT SHARED TIME 0-15 MINUTES: Performed by: NURSE PRACTITIONER

## 2022-03-24 PROCEDURE — 94640 AIRWAY INHALATION TREATMENT: CPT

## 2022-03-24 PROCEDURE — APPNB30 APP NON BILLABLE TIME 0-30 MINS: Performed by: NURSE PRACTITIONER

## 2022-03-24 PROCEDURE — 80053 COMPREHEN METABOLIC PANEL: CPT

## 2022-03-24 PROCEDURE — 83735 ASSAY OF MAGNESIUM: CPT

## 2022-03-24 PROCEDURE — 85025 COMPLETE CBC W/AUTO DIFF WBC: CPT

## 2022-03-24 PROCEDURE — 1200000000 HC SEMI PRIVATE

## 2022-03-24 RX ADMIN — PANTOPRAZOLE SODIUM 40 MG: 40 TABLET, DELAYED RELEASE ORAL at 06:59

## 2022-03-24 RX ADMIN — PIPERACILLIN AND TAZOBACTAM 3375 MG: 3; .375 INJECTION, POWDER, LYOPHILIZED, FOR SOLUTION INTRAVENOUS at 03:21

## 2022-03-24 RX ADMIN — LISINOPRIL 20 MG: 20 TABLET ORAL at 09:20

## 2022-03-24 RX ADMIN — Medication 2 PUFF: at 09:05

## 2022-03-24 RX ADMIN — Medication 2 PUFF: at 21:12

## 2022-03-24 RX ADMIN — SODIUM CHLORIDE, POTASSIUM CHLORIDE, SODIUM LACTATE AND CALCIUM CHLORIDE: 600; 310; 30; 20 INJECTION, SOLUTION INTRAVENOUS at 04:17

## 2022-03-24 RX ADMIN — PIPERACILLIN AND TAZOBACTAM 3375 MG: 3; .375 INJECTION, POWDER, LYOPHILIZED, FOR SOLUTION INTRAVENOUS at 12:27

## 2022-03-24 RX ADMIN — PIPERACILLIN AND TAZOBACTAM 3375 MG: 3; .375 INJECTION, POWDER, LYOPHILIZED, FOR SOLUTION INTRAVENOUS at 20:10

## 2022-03-24 RX ADMIN — SODIUM CHLORIDE, POTASSIUM CHLORIDE, SODIUM LACTATE AND CALCIUM CHLORIDE: 600; 310; 30; 20 INJECTION, SOLUTION INTRAVENOUS at 00:14

## 2022-03-24 RX ADMIN — Medication 10 ML: at 09:20

## 2022-03-24 RX ADMIN — ENOXAPARIN SODIUM 40 MG: 40 INJECTION SUBCUTANEOUS at 09:19

## 2022-03-24 RX ADMIN — LABETALOL HYDROCHLORIDE 10 MG: 5 INJECTION INTRAVENOUS at 00:25

## 2022-03-24 ASSESSMENT — PAIN DESCRIPTION - PAIN TYPE: TYPE: ACUTE PAIN

## 2022-03-24 ASSESSMENT — PAIN DESCRIPTION - LOCATION: LOCATION: ABDOMEN

## 2022-03-24 ASSESSMENT — PAIN SCALES - GENERAL
PAINLEVEL_OUTOF10: 0
PAINLEVEL_OUTOF10: 5
PAINLEVEL_OUTOF10: 0

## 2022-03-24 NOTE — PROGRESS NOTES
PM assessment completed. Pt resting well in bed with no c/o pain or discomfort at this time. Pt tolerating clear liquids, no N/V. Pt tolerating activity well. Pt remains independent. No needs voiced. Pt aware to be NPO after midnight for possibility of lap juany tomorrow with Dr. Brook Kelly.   Fall precautions in place, hourly rounding, call light and belongings in reach, bed in lowest position, wheels locked in place, side rails up x 2, walkways free of clutter

## 2022-03-24 NOTE — PROGRESS NOTES
Patient stable this shift. Minimal complaints of pain. Controlled with PRN pain medication. NPO midnight, lap juany tomorrow. IV intact, IV abx therapy. Tolerating oral diet. Patient from home to home.

## 2022-03-24 NOTE — PROGRESS NOTES
[facility]        Progress Note    Patient Name: Devin Leos  MRN: [id number]  YOB: 1970  Date of Evaluation: 3/24/2022    Chief Complaint: Abdominal pain    Subjective:  No acute events overnight  Denies pain  No nausea or vomiting, tolerated clear liquids yesterday, feels hungry  Resting in bed at this time      Vital Signs:  Patient Vitals for the past 24 hrs:   BP Temp Temp src Pulse Resp SpO2   22 1235 109/74 98 °F (36.7 °C) Oral 64 16 98 %   22 0915 138/81 98.3 °F (36.8 °C) Oral 77 16 96 %   22 0907      95 %   22 0419 (!) 141/79 98.3 °F (36.8 °C) Oral 85 18 95 %   22 0050 118/71   72     22 0045 123/74   74     22 0040 132/81   73     22 0032 130/75   71     22 0028 130/77   73     22 0020 (!) 148/75   72     22 0015 (!) 161/79 98.4 °F (36.9 °C) Oral 74 16 98 %   22 2045 138/74 98.3 °F (36.8 °C) Oral 76 16 96 %   22 1942     16 97 %   22 1641 (!) 149/82 98.2 °F (36.8 °C) Oral 93 16 96 %   22 1445 (!) 164/74 97.5 °F (36.4 °C) Oral 91 16 95 %      TEMPERATURE HISTORY 24H: Temp (24hrs), Av.1 °F (36.7 °C), Min:97.5 °F (36.4 °C), Max:98.4 °F (36.9 °C)    BLOOD PRESSURE HISTORY: Systolic (47AQE), RQL:375 , Min:109 , KFH:441    Diastolic (47OFI), LMA:23, Min:51, Max:126      Intake/Output:  I/O last 3 completed shifts: In: 1060 [P.O.:560; I.V.:500]  Out: 1950 [Urine:1950]  No intake/output data recorded.   Drain/tube Output:       Physical Exam:  General: awake, alert, oriented to person, place, time  Cardiovascular:  regular rate and rhythm and no murmur noted  Lungs: clear to auscultation  Abdomen: soft, nontender, nondistended, bowel sounds normal   Skin: juandice    Labs:  CBC:    Recent Labs     22  0604 22  0547 22  0550   WBC 6.1 4.4 6.0   HGB 13.3 12.6 12.2   HCT 39.6 38.3 37.1    185 176     BMP: Recent Labs     03/22/22  0604 03/23/22  0547 03/24/22  0550    139 140   K 3.7 3.8 4.1    104 103   CO2 24 23 24   BUN 9 9 10   CREATININE 0.5* 0.6 0.7   GLUCOSE 101* 93 108*     Hepatic:    Recent Labs     03/22/22  0604 03/23/22  0547 03/24/22  0550   * 202* 174*   * 319* 325*   BILITOT 6.4* 6.0* 2.7*   ALKPHOS 261* 253* 220*     Amylase:  No results found for: AMYLASE  Lipase:    Lab Results   Component Value Date    LIPASE 25.0 03/21/2022      Mag:    Lab Results   Component Value Date    MG 1.90 03/24/2022    MG 2.00 03/23/2022     Phos:     Lab Results   Component Value Date    PHOS 2.6 03/24/2022    PHOS 2.9 03/23/2022      Coags:   Lab Results   Component Value Date    PROTIME 10.5 03/23/2022    INR 0.93 03/23/2022       Cultures:  Anaerobic culture  No results found for: LABANAE  Fungus stain  No results found for requested labs within last 30 days. Gram stain  No results found for requested labs within last 30 days. Organism  No results found for: [facility]  Surgical culture  No results found for: CXSURG  Blood culture 1  No results found for requested labs within last 30 days. Blood culture 2  No results found for requested labs within last 30 days. Fecal occult  No results found for requested labs within last 30 days. GI bacterial pathogens by PCR  No results found for requested labs within last 30 days. C. difficile  No results found for requested labs within last 30 days. Urine culture  Lab Results   Component Value Date    LABURIN No growth at 18 to 36 hours 03/21/2022       Pathology:  No relevant pathology     Imaging:  I have personally reviewed the following films:    [place] ERCP BILIARY AND PANCREATIC S&I    Result Date: 3/23/2022  EXAMINATION: 7 SPOT IMAGES FROM AN ERCP COMPARISON: None FLUOROSCOPY DOSE OR TIME/IMAGES: 7 spot films 4 minutes fluoroscopy.  HISTORY: ORDERING SYSTEM PROVIDED HISTORY: in ENDO TECHNOLOGIST PROVIDED HISTORY: Reason for

## 2022-03-24 NOTE — PROGRESS NOTES
[facility] PROGRESS NOTE    3/24/2022 3:13 PM        Name: Rosanna Macdonald . Admitted: 3/21/2022  Primary Care Provider: No primary care provider on file. (Tel: None)      Subjective:  . Patient seen this afternoon. Had ERCP 3/23. Feeling ok, denies abdominal pain. Reviewed interval ancillary notes    Current Medications  HYDROmorphone (DILAUDID) injection 1 mg, Q2H PRN  aspirin-acetaminophen-caffeine (EXCEDRIN MIGRAINE) per tablet 1 tablet, Q6H PRN  albuterol sulfate  (90 Base) MCG/ACT inhaler 2 puff, Q4H PRN  oxyCODONE (ROXICODONE) immediate release tablet 5 mg, Q4H PRN  sodium chloride flush 0.9 % injection 5-40 mL, 2 times per day  sodium chloride flush 0.9 % injection 10 mL, PRN  0.9 % sodium chloride infusion, PRN  enoxaparin (LOVENOX) injection 40 mg, Daily  ondansetron (ZOFRAN-ODT) disintegrating tablet 4 mg, Q8H PRN   Or  ondansetron (ZOFRAN) injection 4 mg, Q6H PRN  polyethylene glycol (GLYCOLAX) packet 17 g, Daily PRN  labetalol (NORMODYNE;TRANDATE) injection 10 mg, Q4H PRN  pantoprazole (PROTONIX) tablet 40 mg, QAM AC  calcium carbonate (TUMS) chewable tablet 500 mg, TID PRN  albuterol sulfate  (90 Base) MCG/ACT inhaler 2 puff, BID  piperacillin-tazobactam (ZOSYN) 3,375 mg in dextrose 5 % 50 mL IVPB extended infusion (mini-bag), Q8H  ketorolac (TORADOL) injection 15 mg, Q6H PRN        Objective:  /74   Pulse 64   Temp 98 °F (36.7 °C) (Oral)   Resp 16   Ht 5' 1\" (1.549 m)   Wt 293 lb (132.9 kg)   LMP 03/02/2022 (Approximate)   SpO2 98%   BMI 55.36 kg/m²     Intake/Output Summary (Last 24 hours) at 3/24/2022 1513  Last data filed at 3/24/2022 0322  Gross per 24 hour   Intake 500 ml   Output 900 ml   Net -400 ml      Wt Readings from Last 3 Encounters:   03/23/22 293 lb (132.9 kg)       General appearance:  Appears comfortable  Eyes: Sclera clear.  Pupils equal.  ENT: Moist oral mucosa. Trachea midline, no adenopathy. Cardiovascular: Regular rhythm, normal S1, S2. No murmur. No edema in lower extremities  Respiratory: Not using accessory muscles. Good inspiratory effort. Clear to auscultation bilaterally, no wheeze or crackles. GI: Abdomen soft, no tenderness, not distended, normal bowel sounds  Musculoskeletal: No cyanosis in digits, neck supple  Neurology: CN 2-12 grossly intact. No speech or motor deficits  Psych: Normal affect. Alert and oriented in time, place and person  Skin: Warm, dry, normal turgor    Labs and Tests:  CBC:   Recent Labs     03/22/22  0604 03/23/22  0547 03/24/22  0550   WBC 6.1 4.4 6.0   HGB 13.3 12.6 12.2    185 176     BMP:    Recent Labs     03/22/22  0604 03/23/22  0547 03/24/22  0550    139 140   K 3.7 3.8 4.1    104 103   CO2 24 23 24   BUN 9 9 10   CREATININE 0.5* 0.6 0.7   GLUCOSE 101* 93 108*     Hepatic:   Recent Labs     03/22/22  0604 03/23/22  0547 03/24/22  0550   * 202* 174*   * 319* 325*   BILITOT 6.4* 6.0* 2.7*   ALKPHOS 261* 253* 220*       Problem List  Principal Problem:    Choledocholithiasis  Resolved Problems:    * No resolved hospital problems. *       Assessment & Plan:   1. Choledocholithiasis-had ERCP 3/23 revealing sludge and small stones. CBD patent at end of procedure. Going for lap juany tomorrow due to lack of OR time today. Dc iv fluids. NPO at midnight. Continue zosyn for now. 2. Hypertension-improved. Hold off on lisinopril for now. Diet: Diet NPO Exceptions are: Sips of Water with Meds  ADULT DIET;  Regular; Low Fat/Low Chol/High Fiber/GLORIA  Code:Full Code        Jerel Brown PA-C   3/24/2022 3:13 PM

## 2022-03-24 NOTE — PROGRESS NOTES
Gastroenterology Progress Note      Sharon Busch is a 49 y.o. female patient. 1. Choledocholithiasis    2. Icterus    3. Abdominal pain, epigastric    4. Nausea    5. Elevated liver enzymes        SUBJECTIVE:  Feels well. No abdominal pain. No N/V.    ROS:  Cardiovascular ROS: no chest pain or dyspnea on exertion  Gastrointestinal ROS: see hpi  Respiratory ROS: no cough, shortness of breath, or wheezing    Physical    VITALS:  /81   Pulse 77   Temp 98.3 °F (36.8 °C) (Oral)   Resp 16   Ht 5' 1\" (1.549 m)   Wt 293 lb (132.9 kg)   LMP 2022 (Approximate)   SpO2 96%   BMI 55.36 kg/m²   TEMPERATURE:  Current - Temp: 98.3 °F (36.8 °C); Max - Temp  Av °F (36.7 °C)  Min: 97.5 °F (36.4 °C)  Max: 98.4 °F (36.9 °C)    NAD  RRR, Nl s1s2  Abdomen soft, ND, NT, no HSM, Bowel sounds normal  No jaundice    Data    Data Review:    Recent Labs     22  0604 22  0547 22  0550   WBC 6.1 4.4 6.0   HGB 13.3 12.6 12.2   HCT 39.6 38.3 37.1   MCV 89.7 89.9 89.8    185 176     Recent Labs     22  0604 22  0547 22  0550    139 140   K 3.7 3.8 4.1    104 103   CO2  24   PHOS 3.3 2.9 2.6   BUN 9 9 10   CREATININE 0.5* 0.6 0.7     Recent Labs     22  0604 22  0547 22  0550   * 202* 174*   * 319* 325*   BILITOT 6.4* 6.0* 2.7*   ALKPHOS 261* 253* 220*     Recent Labs     22  1946   LIPASE 25.0     Recent Labs     22  0547   PROTIME 10.5   INR 0.93     No results for input(s): PTT in the last 72 hours. ASSESSMENT       Elevated liver enzymes, biliary dilation -concerning for CBD stone. s/p ERCP 3/23 with biliary sphincterotomy and removal of sludge and small stones. Bili improved today. No abdominal pain. Cholelithiasis -surgery planning juany. PLAN    - juany per surgery team    Will sign off. Please call if questions.    Discussed with Dr. Leoncio Sousa, PAJET  [facility]    I have seen and examined this patient and agree with the assessment and plan as outlined by the nurse practitioner or physician assistant.

## 2022-03-25 ENCOUNTER — APPOINTMENT (OUTPATIENT)
Dept: GENERAL RADIOLOGY | Age: 52
DRG: 418 | End: 2022-03-25

## 2022-03-25 ENCOUNTER — TELEPHONE (OUTPATIENT)
Dept: PULMONOLOGY | Age: 52
End: 2022-03-25

## 2022-03-25 VITALS
RESPIRATION RATE: 13 BRPM | TEMPERATURE: 97.9 F | DIASTOLIC BLOOD PRESSURE: 70 MMHG | OXYGEN SATURATION: 98 % | SYSTOLIC BLOOD PRESSURE: 137 MMHG

## 2022-03-25 DIAGNOSIS — R91.1 LEFT LOWER LOBE PULMONARY NODULE: Primary | ICD-10-CM

## 2022-03-25 LAB
A/G RATIO: 1.5 (ref 1.1–2.2)
ALBUMIN SERPL-MCNC: 3.7 G/DL (ref 3.4–5)
ALP BLD-CCNC: 195 U/L (ref 40–129)
ALT SERPL-CCNC: 296 U/L (ref 10–40)
ANION GAP SERPL CALCULATED.3IONS-SCNC: 10 MMOL/L (ref 3–16)
AST SERPL-CCNC: 126 U/L (ref 15–37)
BASOPHILS ABSOLUTE: 0 K/UL (ref 0–0.2)
BASOPHILS RELATIVE PERCENT: 0.8 %
BILIRUB SERPL-MCNC: 2 MG/DL (ref 0–1)
BUN BLDV-MCNC: 16 MG/DL (ref 7–20)
CALCIUM SERPL-MCNC: 9 MG/DL (ref 8.3–10.6)
CHLORIDE BLD-SCNC: 102 MMOL/L (ref 99–110)
CO2: 27 MMOL/L (ref 21–32)
CREAT SERPL-MCNC: 0.9 MG/DL (ref 0.6–1.1)
EOSINOPHILS ABSOLUTE: 0.2 K/UL (ref 0–0.6)
EOSINOPHILS RELATIVE PERCENT: 4 %
GFR AFRICAN AMERICAN: >60
GFR NON-AFRICAN AMERICAN: >60
GLUCOSE BLD-MCNC: 92 MG/DL (ref 70–99)
HCG(URINE) PREGNANCY TEST: NEGATIVE
HCT VFR BLD CALC: 35.8 % (ref 36–48)
HEMOGLOBIN: 11.8 G/DL (ref 12–16)
LYMPHOCYTES ABSOLUTE: 1.5 K/UL (ref 1–5.1)
LYMPHOCYTES RELATIVE PERCENT: 24.2 %
MAGNESIUM: 2.1 MG/DL (ref 1.8–2.4)
MCH RBC QN AUTO: 29.6 PG (ref 26–34)
MCHC RBC AUTO-ENTMCNC: 32.8 G/DL (ref 31–36)
MCV RBC AUTO: 90.2 FL (ref 80–100)
MONOCYTES ABSOLUTE: 0.5 K/UL (ref 0–1.3)
MONOCYTES RELATIVE PERCENT: 8.6 %
NEUTROPHILS ABSOLUTE: 3.9 K/UL (ref 1.7–7.7)
NEUTROPHILS RELATIVE PERCENT: 62.4 %
PDW BLD-RTO: 14.3 % (ref 12.4–15.4)
PHOSPHORUS: 3 MG/DL (ref 2.5–4.9)
PLATELET # BLD: 179 K/UL (ref 135–450)
PMV BLD AUTO: 8.5 FL (ref 5–10.5)
POTASSIUM SERPL-SCNC: 4.6 MMOL/L (ref 3.5–5.1)
RBC # BLD: 3.98 M/UL (ref 4–5.2)
SODIUM BLD-SCNC: 139 MMOL/L (ref 136–145)
TOTAL PROTEIN: 6.1 G/DL (ref 6.4–8.2)
WBC # BLD: 6.2 K/UL (ref 4–11)

## 2022-03-25 PROCEDURE — 85025 COMPLETE CBC W/AUTO DIFF WBC: CPT

## 2022-03-25 PROCEDURE — APPNB30 APP NON BILLABLE TIME 0-30 MINS: Performed by: NURSE PRACTITIONER

## 2022-03-25 PROCEDURE — 6360000002 HC RX W HCPCS: Performed by: SURGERY

## 2022-03-25 PROCEDURE — 94640 AIRWAY INHALATION TREATMENT: CPT

## 2022-03-25 PROCEDURE — 3600000014 HC SURGERY LEVEL 4 ADDTL 15MIN: Performed by: SURGERY

## 2022-03-25 PROCEDURE — 6370000000 HC RX 637 (ALT 250 FOR IP): Performed by: SURGERY

## 2022-03-25 PROCEDURE — 2500000003 HC RX 250 WO HCPCS: Performed by: NURSE ANESTHETIST, CERTIFIED REGISTERED

## 2022-03-25 PROCEDURE — 3700000000 HC ANESTHESIA ATTENDED CARE: Performed by: SURGERY

## 2022-03-25 PROCEDURE — 2500000003 HC RX 250 WO HCPCS: Performed by: SURGERY

## 2022-03-25 PROCEDURE — 84100 ASSAY OF PHOSPHORUS: CPT

## 2022-03-25 PROCEDURE — 88304 TISSUE EXAM BY PATHOLOGIST: CPT

## 2022-03-25 PROCEDURE — 2709999900 HC NON-CHARGEABLE SUPPLY: Performed by: SURGERY

## 2022-03-25 PROCEDURE — 2580000003 HC RX 258: Performed by: ANESTHESIOLOGY

## 2022-03-25 PROCEDURE — 74300 X-RAY BILE DUCTS/PANCREAS: CPT

## 2022-03-25 PROCEDURE — 3700000001 HC ADD 15 MINUTES (ANESTHESIA): Performed by: SURGERY

## 2022-03-25 PROCEDURE — APPSS15 APP SPLIT SHARED TIME 0-15 MINUTES: Performed by: NURSE PRACTITIONER

## 2022-03-25 PROCEDURE — 1200000000 HC SEMI PRIVATE

## 2022-03-25 PROCEDURE — 6360000002 HC RX W HCPCS: Performed by: ANESTHESIOLOGY

## 2022-03-25 PROCEDURE — 84703 CHORIONIC GONADOTROPIN ASSAY: CPT

## 2022-03-25 PROCEDURE — 36415 COLL VENOUS BLD VENIPUNCTURE: CPT

## 2022-03-25 PROCEDURE — 7100000000 HC PACU RECOVERY - FIRST 15 MIN: Performed by: SURGERY

## 2022-03-25 PROCEDURE — 6360000002 HC RX W HCPCS: Performed by: NURSE ANESTHETIST, CERTIFIED REGISTERED

## 2022-03-25 PROCEDURE — 0FT44ZZ RESECTION OF GALLBLADDER, PERCUTANEOUS ENDOSCOPIC APPROACH: ICD-10-PCS | Performed by: SURGERY

## 2022-03-25 PROCEDURE — 2580000003 HC RX 258: Performed by: SURGERY

## 2022-03-25 PROCEDURE — 6360000004 HC RX CONTRAST MEDICATION: Performed by: SURGERY

## 2022-03-25 PROCEDURE — 2580000003 HC RX 258: Performed by: NURSE ANESTHETIST, CERTIFIED REGISTERED

## 2022-03-25 PROCEDURE — A4217 STERILE WATER/SALINE, 500 ML: HCPCS | Performed by: SURGERY

## 2022-03-25 PROCEDURE — 6360000002 HC RX W HCPCS

## 2022-03-25 PROCEDURE — 83735 ASSAY OF MAGNESIUM: CPT

## 2022-03-25 PROCEDURE — 99232 SBSQ HOSP IP/OBS MODERATE 35: CPT | Performed by: SURGERY

## 2022-03-25 PROCEDURE — 6360000002 HC RX W HCPCS: Performed by: INTERNAL MEDICINE

## 2022-03-25 PROCEDURE — 3600000004 HC SURGERY LEVEL 4 BASE: Performed by: SURGERY

## 2022-03-25 PROCEDURE — 2720000010 HC SURG SUPPLY STERILE: Performed by: SURGERY

## 2022-03-25 PROCEDURE — BF101ZZ FLUOROSCOPY OF BILE DUCTS USING LOW OSMOLAR CONTRAST: ICD-10-PCS | Performed by: SURGERY

## 2022-03-25 PROCEDURE — 7100000001 HC PACU RECOVERY - ADDTL 15 MIN: Performed by: SURGERY

## 2022-03-25 PROCEDURE — 47563 LAPARO CHOLECYSTECTOMY/GRAPH: CPT | Performed by: SURGERY

## 2022-03-25 PROCEDURE — 2580000003 HC RX 258: Performed by: INTERNAL MEDICINE

## 2022-03-25 PROCEDURE — 80053 COMPREHEN METABOLIC PANEL: CPT

## 2022-03-25 RX ORDER — LABETALOL HYDROCHLORIDE 5 MG/ML
INJECTION, SOLUTION INTRAVENOUS PRN
Status: DISCONTINUED | OUTPATIENT
Start: 2022-03-25 | End: 2022-03-25 | Stop reason: SDUPTHER

## 2022-03-25 RX ORDER — SODIUM CHLORIDE 9 MG/ML
INJECTION, SOLUTION INTRAVENOUS CONTINUOUS
Status: DISCONTINUED | OUTPATIENT
Start: 2022-03-25 | End: 2022-03-25

## 2022-03-25 RX ORDER — PROPOFOL 10 MG/ML
INJECTION, EMULSION INTRAVENOUS PRN
Status: DISCONTINUED | OUTPATIENT
Start: 2022-03-25 | End: 2022-03-25 | Stop reason: SDUPTHER

## 2022-03-25 RX ORDER — LIDOCAINE HYDROCHLORIDE 20 MG/ML
INJECTION, SOLUTION EPIDURAL; INFILTRATION; INTRACAUDAL; PERINEURAL PRN
Status: DISCONTINUED | OUTPATIENT
Start: 2022-03-25 | End: 2022-03-25 | Stop reason: SDUPTHER

## 2022-03-25 RX ORDER — SODIUM CHLORIDE, SODIUM LACTATE, POTASSIUM CHLORIDE, AND CALCIUM CHLORIDE .6; .31; .03; .02 G/100ML; G/100ML; G/100ML; G/100ML
IRRIGANT IRRIGATION
Status: COMPLETED | OUTPATIENT
Start: 2022-03-25 | End: 2022-03-25

## 2022-03-25 RX ORDER — BUPIVACAINE HYDROCHLORIDE 5 MG/ML
INJECTION, SOLUTION EPIDURAL; INTRACAUDAL
Status: COMPLETED | OUTPATIENT
Start: 2022-03-25 | End: 2022-03-25

## 2022-03-25 RX ORDER — LABETALOL HYDROCHLORIDE 5 MG/ML
5 INJECTION, SOLUTION INTRAVENOUS
Status: DISCONTINUED | OUTPATIENT
Start: 2022-03-25 | End: 2022-03-25 | Stop reason: HOSPADM

## 2022-03-25 RX ORDER — GLYCOPYRROLATE 1 MG/5 ML
SYRINGE (ML) INTRAVENOUS PRN
Status: DISCONTINUED | OUTPATIENT
Start: 2022-03-25 | End: 2022-03-25 | Stop reason: SDUPTHER

## 2022-03-25 RX ORDER — SODIUM CHLORIDE 9 MG/ML
INJECTION, SOLUTION INTRAVENOUS CONTINUOUS
Status: DISCONTINUED | OUTPATIENT
Start: 2022-03-25 | End: 2022-03-26

## 2022-03-25 RX ORDER — OXYCODONE HYDROCHLORIDE 5 MG/1
5 TABLET ORAL
Status: DISCONTINUED | OUTPATIENT
Start: 2022-03-25 | End: 2022-03-25 | Stop reason: HOSPADM

## 2022-03-25 RX ORDER — ALBUTEROL SULFATE 2.5 MG/3ML
2.5 SOLUTION RESPIRATORY (INHALATION) ONCE
Status: DISCONTINUED | OUTPATIENT
Start: 2022-03-25 | End: 2022-03-25 | Stop reason: HOSPADM

## 2022-03-25 RX ORDER — SUCCINYLCHOLINE/SOD CL,ISO/PF 200MG/10ML
SYRINGE (ML) INTRAVENOUS PRN
Status: DISCONTINUED | OUTPATIENT
Start: 2022-03-25 | End: 2022-03-25 | Stop reason: SDUPTHER

## 2022-03-25 RX ORDER — ONDANSETRON 2 MG/ML
4 INJECTION INTRAMUSCULAR; INTRAVENOUS
Status: DISCONTINUED | OUTPATIENT
Start: 2022-03-25 | End: 2022-03-25 | Stop reason: HOSPADM

## 2022-03-25 RX ORDER — MIDAZOLAM HYDROCHLORIDE 1 MG/ML
INJECTION INTRAMUSCULAR; INTRAVENOUS PRN
Status: DISCONTINUED | OUTPATIENT
Start: 2022-03-25 | End: 2022-03-25 | Stop reason: SDUPTHER

## 2022-03-25 RX ORDER — FENTANYL CITRATE 50 UG/ML
INJECTION, SOLUTION INTRAMUSCULAR; INTRAVENOUS PRN
Status: DISCONTINUED | OUTPATIENT
Start: 2022-03-25 | End: 2022-03-25 | Stop reason: SDUPTHER

## 2022-03-25 RX ORDER — ONDANSETRON 2 MG/ML
INJECTION INTRAMUSCULAR; INTRAVENOUS PRN
Status: DISCONTINUED | OUTPATIENT
Start: 2022-03-25 | End: 2022-03-25 | Stop reason: SDUPTHER

## 2022-03-25 RX ORDER — DEXAMETHASONE SODIUM PHOSPHATE 4 MG/ML
INJECTION, SOLUTION INTRA-ARTICULAR; INTRALESIONAL; INTRAMUSCULAR; INTRAVENOUS; SOFT TISSUE PRN
Status: DISCONTINUED | OUTPATIENT
Start: 2022-03-25 | End: 2022-03-25 | Stop reason: SDUPTHER

## 2022-03-25 RX ORDER — HYDROMORPHONE HCL 110MG/55ML
0.5 PATIENT CONTROLLED ANALGESIA SYRINGE INTRAVENOUS EVERY 5 MIN PRN
Status: DISCONTINUED | OUTPATIENT
Start: 2022-03-25 | End: 2022-03-25 | Stop reason: HOSPADM

## 2022-03-25 RX ORDER — ALBUTEROL SULFATE 2.5 MG/3ML
SOLUTION RESPIRATORY (INHALATION)
Status: COMPLETED
Start: 2022-03-25 | End: 2022-03-25

## 2022-03-25 RX ORDER — HYDROMORPHONE HCL 110MG/55ML
0.25 PATIENT CONTROLLED ANALGESIA SYRINGE INTRAVENOUS EVERY 5 MIN PRN
Status: DISCONTINUED | OUTPATIENT
Start: 2022-03-25 | End: 2022-03-25 | Stop reason: HOSPADM

## 2022-03-25 RX ORDER — MAGNESIUM HYDROXIDE 1200 MG/15ML
LIQUID ORAL CONTINUOUS PRN
Status: COMPLETED | OUTPATIENT
Start: 2022-03-25 | End: 2022-03-25

## 2022-03-25 RX ORDER — LIDOCAINE HYDROCHLORIDE 10 MG/ML
1 INJECTION, SOLUTION EPIDURAL; INFILTRATION; INTRACAUDAL; PERINEURAL
Status: DISCONTINUED | OUTPATIENT
Start: 2022-03-25 | End: 2022-03-25 | Stop reason: HOSPADM

## 2022-03-25 RX ORDER — SODIUM CHLORIDE 9 MG/ML
INJECTION, SOLUTION INTRAVENOUS CONTINUOUS PRN
Status: DISCONTINUED | OUTPATIENT
Start: 2022-03-25 | End: 2022-03-25 | Stop reason: SDUPTHER

## 2022-03-25 RX ORDER — ROCURONIUM BROMIDE 10 MG/ML
INJECTION, SOLUTION INTRAVENOUS PRN
Status: DISCONTINUED | OUTPATIENT
Start: 2022-03-25 | End: 2022-03-25 | Stop reason: SDUPTHER

## 2022-03-25 RX ADMIN — Medication 2 PUFF: at 08:19

## 2022-03-25 RX ADMIN — HYDROMORPHONE HYDROCHLORIDE 1 MG: 2 INJECTION, SOLUTION INTRAMUSCULAR; INTRAVENOUS; SUBCUTANEOUS at 17:26

## 2022-03-25 RX ADMIN — DEXAMETHASONE SODIUM PHOSPHATE 10 MG: 4 INJECTION, SOLUTION INTRAMUSCULAR; INTRAVENOUS at 14:30

## 2022-03-25 RX ADMIN — FENTANYL CITRATE 100 MCG: 50 INJECTION, SOLUTION INTRAMUSCULAR; INTRAVENOUS at 14:07

## 2022-03-25 RX ADMIN — KETOROLAC TROMETHAMINE 15 MG: 15 INJECTION, SOLUTION INTRAMUSCULAR; INTRAVENOUS at 20:32

## 2022-03-25 RX ADMIN — PIPERACILLIN AND TAZOBACTAM 3375 MG: 3; .375 INJECTION, POWDER, LYOPHILIZED, FOR SOLUTION INTRAVENOUS at 18:40

## 2022-03-25 RX ADMIN — ROCURONIUM BROMIDE 40 MG: 10 INJECTION, SOLUTION INTRAVENOUS at 14:13

## 2022-03-25 RX ADMIN — ONDANSETRON 4 MG: 2 INJECTION INTRAMUSCULAR; INTRAVENOUS at 14:30

## 2022-03-25 RX ADMIN — LIDOCAINE HYDROCHLORIDE 100 MG: 20 INJECTION, SOLUTION EPIDURAL; INFILTRATION; INTRACAUDAL; PERINEURAL at 14:07

## 2022-03-25 RX ADMIN — ALBUTEROL SULFATE: 2.5 SOLUTION RESPIRATORY (INHALATION) at 12:44

## 2022-03-25 RX ADMIN — Medication 200 MG: at 14:07

## 2022-03-25 RX ADMIN — SODIUM CHLORIDE: 9 INJECTION, SOLUTION INTRAVENOUS at 14:00

## 2022-03-25 RX ADMIN — HYDROMORPHONE HYDROCHLORIDE 1 MG: 2 INJECTION, SOLUTION INTRAMUSCULAR; INTRAVENOUS; SUBCUTANEOUS at 22:58

## 2022-03-25 RX ADMIN — MIDAZOLAM 2 MG: 1 INJECTION INTRAMUSCULAR; INTRAVENOUS at 14:00

## 2022-03-25 RX ADMIN — SUGAMMADEX 200 MG: 100 INJECTION, SOLUTION INTRAVENOUS at 15:07

## 2022-03-25 RX ADMIN — SODIUM CHLORIDE: 9 INJECTION, SOLUTION INTRAVENOUS at 17:42

## 2022-03-25 RX ADMIN — LABETALOL HYDROCHLORIDE 5 MG: 5 INJECTION, SOLUTION INTRAVENOUS at 14:35

## 2022-03-25 RX ADMIN — Medication 0.2 MG: at 14:19

## 2022-03-25 RX ADMIN — ROCURONIUM BROMIDE 10 MG: 10 INJECTION, SOLUTION INTRAVENOUS at 14:07

## 2022-03-25 RX ADMIN — HYDROMORPHONE HYDROCHLORIDE 0.5 MG: 2 INJECTION, SOLUTION INTRAMUSCULAR; INTRAVENOUS; SUBCUTANEOUS at 15:37

## 2022-03-25 RX ADMIN — PIPERACILLIN AND TAZOBACTAM 3375 MG: 3; .375 INJECTION, POWDER, LYOPHILIZED, FOR SOLUTION INTRAVENOUS at 04:01

## 2022-03-25 RX ADMIN — PROPOFOL 200 MG: 10 INJECTION, EMULSION INTRAVENOUS at 14:07

## 2022-03-25 RX ADMIN — OXYCODONE 5 MG: 5 TABLET ORAL at 20:32

## 2022-03-25 RX ADMIN — PIPERACILLIN AND TAZOBACTAM 3375 MG: 3; .375 INJECTION, POWDER, LYOPHILIZED, FOR SOLUTION INTRAVENOUS at 10:08

## 2022-03-25 RX ADMIN — HYDROMORPHONE HYDROCHLORIDE 0.5 MG: 2 INJECTION, SOLUTION INTRAMUSCULAR; INTRAVENOUS; SUBCUTANEOUS at 16:00

## 2022-03-25 ASSESSMENT — PULMONARY FUNCTION TESTS
PIF_VALUE: 29
PIF_VALUE: 31
PIF_VALUE: 29
PIF_VALUE: 30
PIF_VALUE: 31
PIF_VALUE: 20
PIF_VALUE: 1
PIF_VALUE: 18
PIF_VALUE: 21
PIF_VALUE: 35
PIF_VALUE: 26
PIF_VALUE: 30
PIF_VALUE: 25
PIF_VALUE: 28
PIF_VALUE: 0
PIF_VALUE: 29
PIF_VALUE: 1
PIF_VALUE: 30
PIF_VALUE: 22
PIF_VALUE: 29
PIF_VALUE: 29
PIF_VALUE: 23
PIF_VALUE: 25
PIF_VALUE: 25
PIF_VALUE: 29
PIF_VALUE: 30
PIF_VALUE: 31
PIF_VALUE: 31
PIF_VALUE: 21
PIF_VALUE: 30
PIF_VALUE: 1
PIF_VALUE: 30
PIF_VALUE: 27
PIF_VALUE: 23
PIF_VALUE: 3
PIF_VALUE: 30
PIF_VALUE: 1
PIF_VALUE: 22
PIF_VALUE: 30
PIF_VALUE: 31
PIF_VALUE: 30
PIF_VALUE: 31
PIF_VALUE: 23
PIF_VALUE: 31
PIF_VALUE: 23
PIF_VALUE: 30
PIF_VALUE: 4
PIF_VALUE: 30
PIF_VALUE: 0
PIF_VALUE: 23
PIF_VALUE: 3
PIF_VALUE: 30
PIF_VALUE: 1
PIF_VALUE: 29
PIF_VALUE: 30
PIF_VALUE: 26
PIF_VALUE: 26
PIF_VALUE: 23
PIF_VALUE: 21
PIF_VALUE: 25
PIF_VALUE: 31
PIF_VALUE: 30
PIF_VALUE: 28
PIF_VALUE: 7
PIF_VALUE: 30
PIF_VALUE: 23
PIF_VALUE: 27
PIF_VALUE: 25
PIF_VALUE: 23
PIF_VALUE: 30
PIF_VALUE: 29
PIF_VALUE: 0
PIF_VALUE: 3
PIF_VALUE: 30
PIF_VALUE: 23

## 2022-03-25 ASSESSMENT — PAIN DESCRIPTION - LOCATION
LOCATION: ABDOMEN

## 2022-03-25 ASSESSMENT — PAIN DESCRIPTION - ONSET
ONSET: ON-GOING

## 2022-03-25 ASSESSMENT — PAIN SCALES - GENERAL
PAINLEVEL_OUTOF10: 8
PAINLEVEL_OUTOF10: 6
PAINLEVEL_OUTOF10: 7
PAINLEVEL_OUTOF10: 7
PAINLEVEL_OUTOF10: 10

## 2022-03-25 ASSESSMENT — PAIN - FUNCTIONAL ASSESSMENT
PAIN_FUNCTIONAL_ASSESSMENT: PREVENTS OR INTERFERES SOME ACTIVE ACTIVITIES AND ADLS
PAIN_FUNCTIONAL_ASSESSMENT: 0-10

## 2022-03-25 ASSESSMENT — PAIN DESCRIPTION - DESCRIPTORS
DESCRIPTORS: SORE;ACHING
DESCRIPTORS: SORE;ACHING
DESCRIPTORS: PRESSURE

## 2022-03-25 ASSESSMENT — PAIN DESCRIPTION - PAIN TYPE
TYPE: SURGICAL PAIN

## 2022-03-25 ASSESSMENT — PAIN DESCRIPTION - ORIENTATION
ORIENTATION: MID
ORIENTATION: RIGHT
ORIENTATION: MID

## 2022-03-25 ASSESSMENT — PAIN DESCRIPTION - PROGRESSION
CLINICAL_PROGRESSION: GRADUALLY WORSENING
CLINICAL_PROGRESSION: NOT CHANGED

## 2022-03-25 ASSESSMENT — PAIN DESCRIPTION - FREQUENCY
FREQUENCY: CONTINUOUS

## 2022-03-25 ASSESSMENT — LIFESTYLE VARIABLES: SMOKING_STATUS: 1

## 2022-03-25 NOTE — PROGRESS NOTES
PM assessment completed. Pt resting well in bed, denies pain. Spouse at bedside. Vitals stable. Pt tolerating regular diet and activity well. Pt aware to be NPO at midnight for lap juany tomorrow. No needs voiced. Fall precautions in place, hourly rounding, call light and belongings in reach, bed in lowest position, wheels locked in place, side rails up x 2, walkways free of clutter.

## 2022-03-25 NOTE — BRIEF OP NOTE
Brief Postoperative Note      Patient: Shirlene Chong  YOB: 1970  MRN: [id number]    Date of Procedure: 3/25/2022    Pre-Op Diagnosis: ACUTE CHOLECYSTITIS    Post-Op Diagnosis: Same       Procedure(s):  LAPAROSCOPIC CHOLECYSTECTOMY WITH CHOLANGIOGRAM    Surgeon(s):  John López MD    Assistant:  Surgical Assistant: Kely Hernadez    Anesthesia: General    Estimated Blood Loss (mL): Minimal    Complications: None    Specimens:   ID Type Source Tests Collected by Time Destination   A : A) GALLBLADDER Tissue Gallbladder SURGICAL PATHOLOGY John López MD 3/25/2022 1428        Implants:  * No implants in log *      Drains: * No LDAs found *    Findings: Severe chronic cholecystitis.  GB removed, IOC normal    Electronically signed by John López MD on 3/25/2022 at 3:16 PM

## 2022-03-25 NOTE — ANESTHESIA PRE PROCEDURE
Department of Anesthesiology  Preprocedure Note       Name:  Mihai Barcenas   Age:  49 y.o.  :  1970                                          MRN:  [id number]         Date:  3/25/2022      Surgeon: Carine Mooney):  Ernst Singh MD    Procedure: Procedure(s):  LAPAROSCOPIC CHOLECYSTECTOMY WITH CHOLANGIOGRAM    Medications prior to admission:   Prior to Admission medications    Medication Sig Start Date End Date Taking? Authorizing Provider   albuterol (PROVENTIL HFA) 108 (90 BASE) MCG/ACT inhaler Inhale 2 puffs into the lungs every 6 hours as needed for Wheezing.   Patient not taking: Reported on 3/22/2022 4/16/15   Martina Lay MD       Current medications:    Current Facility-Administered Medications   Medication Dose Route Frequency Provider Last Rate Last Admin    HYDROmorphone (DILAUDID) injection 0.25 mg  0.25 mg IntraVENous Q5 Min PRN Enoc Hunter MD        HYDROmorphone (DILAUDID) injection 0.5 mg  0.5 mg IntraVENous Q5 Min PRN Enoc Hunter MD        oxyCODONE (ROXICODONE) immediate release tablet 5 mg  5 mg Oral Once PRN Enoc Hunter MD        ondansetron [facility]) injection 4 mg  4 mg IntraVENous Once PRN Enoc Hunter MD        labetalol (NORMODYNE;TRANDATE) injection 5 mg  5 mg IntraVENous Q15 Min PRN Enoc Hunter MD        0.9 % sodium chloride infusion   IntraVENous Continuous Enoc Hunter MD        HYDROmorphone (DILAUDID) injection 1 mg  1 mg IntraVENous Q2H PRN Charmaine Hastings MD        aspirin-acetaminophen-caffeine [facility] MIGRAINE) per tablet 1 tablet  1 tablet Oral Q6H PRN Brooke Fuentes PA-C   1 tablet at 22 1522    albuterol sulfate  (90 Base) MCG/ACT inhaler 2 puff  2 puff Inhalation Q4H PRN Charmaine Hastinsg MD        oxyCODONE (ROXICODONE) immediate release tablet 5 mg  5 mg Oral Q4H PRN Charmaine Hastings MD   5 mg at 22 0255    sodium chloride flush 0.9 % injection 5-40 mL  5-40 mL IntraVENous 2 times per day Zach Perez Alberto Coulter MD   10 mL at 22 0920    sodium chloride flush 0.9 % injection 10 mL  10 mL IntraVENous PRN Kamala Sales MD        0.9 % sodium chloride infusion  25 mL IntraVENous PRN Kamala Sales MD   Stopped at 22 1030    enoxaparin (LOVENOX) injection 40 mg  40 mg SubCUTAneous Daily Kamala Sales MD   40 mg at 22 0919    ondansetron (ZOFRAN-ODT) disintegrating tablet 4 mg  4 mg Oral Q8H PRN Kamala Sales MD        Or    [facility]ron [facility]) injection 4 mg  4 mg IntraVENous Q6H PRN Kamala Sales MD   4 mg at 22 0942    polyethylene glycol (GLYCOLAX) packet 17 g  17 g Oral Daily PRN Kamala Sales MD        labetalol (NORMODYNE;TRANDATE) injection 10 mg  10 mg IntraVENous Q4H PRN Kamala Sales MD   10 mg at 22 0025    pantoprazole (PROTONIX) tablet 40 mg  40 mg Oral QAM AC Kamala Sales MD   40 mg at 22 0659    calcium carbonate (TUMS) chewable tablet 500 mg  500 mg Oral TID PRN Kamala Sales MD   500 mg at 22 0255    albuterol sulfate  (90 Base) MCG/ACT inhaler 2 puff  2 puff Inhalation BID Kamala Sales MD   2 puff at 22 0819    piperacillin-tazobactam (ZOSYN) 3,375 mg in dextrose 5 % 50 mL IVPB extended infusion (mini-bag)  3,375 mg IntraVENous Rogelioradha Johns MD 12.5 mL/hr at 22 1008 3,375 mg at 22 1008    ketorolac (TORADOL) injection 15 mg  15 mg IntraVENous Q6H PRN Kamala Sales MD   15 mg at 22 1154       Allergies:  No Known Allergies    Problem List:    Patient Active Problem List   Diagnosis Code    Choledocholithiasis K80.50       Past Medical History:  History reviewed. No pertinent past medical history.     Past Surgical History:        Procedure Laterality Date     SECTION      ERCP N/A 3/23/2022    ERCP SPHINCTER/PAPILLOTOMY performed by Kamala Sales MD at [address] [place] [address] ERCP N/A 3/23/2022    ERCP STONE REMOVAL performed by Kamala Sales MD at [facility]. [address] History:    Social History     Tobacco Use    Smoking status: Current Every Day Smoker     Packs/day: 1.00     Years: 10.00     Pack years: 10.00    Smokeless tobacco: Never Used   Substance Use Topics    Alcohol use: Yes     Comment: occas                                Ready to quit: Not Answered  Counseling given: Not Answered      Vital Signs (Current):   Vitals:    03/25/22 0715 03/25/22 0800 03/25/22 1000 03/25/22 1115   BP: 130/84 (!) 150/87 (!) 150/80 (!) 166/92   Pulse: 90 66 74 73   Resp: 16 16 16 17   Temp: 98.2 °F (36.8 °C) 98.4 °F (36.9 °C) 98 °F (36.7 °C) 97.6 °F (36.4 °C)   TempSrc: Oral Oral Oral Temporal   SpO2: 96% 94% 94% 96%   Weight:       Height:                                                  BP Readings from Last 3 Encounters:   03/25/22 (!) 166/92   03/23/22 (!) 116/53       NPO Status: Time of last liquid consumption: 2300                        Time of last solid consumption: 2130                        Date of last liquid consumption: 03/24/22                        Date of last solid food consumption: 03/24/22    BMI:   Wt Readings from Last 3 Encounters:   03/23/22 293 lb (132.9 kg)     Body mass index is 55.36 kg/m². CBC:   Lab Results   Component Value Date    WBC 6.2 03/25/2022    RBC 3.98 03/25/2022    HGB 11.8 03/25/2022    HCT 35.8 03/25/2022    MCV 90.2 03/25/2022    RDW 14.3 03/25/2022     03/25/2022       CMP:   Lab Results   Component Value Date     03/25/2022    K 4.6 03/25/2022    K 4.4 03/21/2022     03/25/2022    CO2 27 03/25/2022    BUN 16 03/25/2022    CREATININE 0.9 03/25/2022    GFRAA >60 03/25/2022    GFRAA >60 10/12/2010    AGRATIO 1.5 03/25/2022    LABGLOM >60 03/25/2022    GLUCOSE 92 03/25/2022    PROT 6.1 03/25/2022    PROT 6.8 10/12/2010    CALCIUM 9.0 03/25/2022    BILITOT 2.0 03/25/2022    ALKPHOS 195 03/25/2022     03/25/2022     03/25/2022       POC Tests: No results for input(s): POCGLU, POCNA, POCK, POCCL, POCBUN, POCHEMO, POCHCT in the last 72 hours.     Coags:   Lab Results   Component Value Date    PROTIME 10.5 03/23/2022    INR 0.93 03/23/2022       HCG (If Applicable):   Lab Results   Component Value Date    PREGTESTUR Negative 03/25/2022        ABGs: No results found for: PHART, PO2ART, KVA4TLP, KSP7CEM, BEART, O4AOVNLU     Type & Screen (If Applicable):  No results found for: LABABO, LABRH    Drug/Infectious Status (If Applicable):  No results found for: HIV, HEPCAB    COVID-19 Screening (If Applicable):   Lab Results   Component Value Date    COVID19 Not Detected 03/22/2022           Anesthesia Evaluation  Patient summary reviewed and Nursing notes reviewed no history of anesthetic complications:   Airway: Mallampati: II  TM distance: >3 FB   Neck ROM: full  Mouth opening: > = 3 FB Dental:      Comment: Many broken teeth    Pulmonary: breath sounds clear to auscultation  (+) current smoker                           Cardiovascular:        (-) CABG/stent, dysrhythmias and  angina      Rhythm: regular  Rate: normal                    Neuro/Psych:      (-) seizures, TIA and CVA           GI/Hepatic/Renal:        (-) GERD       Endo/Other:        (-) hypothyroidism, hyperthyroidism, blood dyscrasia               Abdominal:   (+) obese,           Vascular:     - DVT and PE. Other Findings:             Anesthesia Plan      general     ASA 3       Induction: intravenous. MIPS: Postoperative opioids intended and Prophylactic antiemetics administered. Anesthetic plan and risks discussed with patient. Plan discussed with CRNA.                   Stephanie Mendoza MD   3/25/2022

## 2022-03-25 NOTE — PROGRESS NOTES
[facility]    3/25/2022 1:16 PM        Name: Harrison Bravo . Admitted: 3/21/2022  Primary Care Provider: No primary care provider on file. (Tel: None)      Subjective:  . Patient seen this am, feeling well. Denies abdominal pain. Having lap juany today.     Reviewed interval ancillary notes    Current Medications  HYDROmorphone (DILAUDID) injection 0.25 mg, Q5 Min PRN  HYDROmorphone (DILAUDID) injection 0.5 mg, Q5 Min PRN  oxyCODONE (ROXICODONE) immediate release tablet 5 mg, Once PRN  ondansetron (ZOFRAN) injection 4 mg, Once PRN  labetalol (NORMODYNE;TRANDATE) injection 5 mg, Q15 Min PRN  0.9 % sodium chloride infusion, Continuous  albuterol (PROVENTIL) nebulizer solution 2.5 mg, Once  HYDROmorphone (DILAUDID) injection 1 mg, Q2H PRN  aspirin-acetaminophen-caffeine (EXCEDRIN MIGRAINE) per tablet 1 tablet, Q6H PRN  albuterol sulfate  (90 Base) MCG/ACT inhaler 2 puff, Q4H PRN  oxyCODONE (ROXICODONE) immediate release tablet 5 mg, Q4H PRN  sodium chloride flush 0.9 % injection 5-40 mL, 2 times per day  sodium chloride flush 0.9 % injection 10 mL, PRN  0.9 % sodium chloride infusion, PRN  enoxaparin (LOVENOX) injection 40 mg, Daily  ondansetron (ZOFRAN-ODT) disintegrating tablet 4 mg, Q8H PRN   Or  ondansetron (ZOFRAN) injection 4 mg, Q6H PRN  polyethylene glycol (GLYCOLAX) packet 17 g, Daily PRN  labetalol (NORMODYNE;TRANDATE) injection 10 mg, Q4H PRN  pantoprazole (PROTONIX) tablet 40 mg, QAM AC  calcium carbonate (TUMS) chewable tablet 500 mg, TID PRN  albuterol sulfate  (90 Base) MCG/ACT inhaler 2 puff, BID  piperacillin-tazobactam (ZOSYN) 3,375 mg in dextrose 5 % 50 mL IVPB extended infusion (mini-bag), Q8H  ketorolac (TORADOL) injection 15 mg, Q6H PRN        Objective:  BP (!) 166/92   Pulse 73   Temp 97.6 °F (36.4 °C) (Temporal)   Resp 17   Ht 5' 1\" (1.549 m)   Wt 293 lb (132.9 kg) LMP 03/02/2022 (Approximate)   SpO2 96%   BMI 55.36 kg/m²     Intake/Output Summary (Last 24 hours) at 3/25/2022 1316  Last data filed at 3/24/2022 2015  Gross per 24 hour   Intake 500 ml   Output 650 ml   Net -150 ml      Wt Readings from Last 3 Encounters:   03/23/22 293 lb (132.9 kg)       General appearance:  Appears comfortable  Eyes: Sclera clear. Pupils equal.  ENT: Moist oral mucosa. Trachea midline, no adenopathy. Cardiovascular: Regular rhythm, normal S1, S2. No murmur. No edema in lower extremities  Respiratory: Not using accessory muscles. Good inspiratory effort. Clear to auscultation bilaterally, no wheeze or crackles. GI: Abdomen soft, no tenderness, not distended, normal bowel sounds  Musculoskeletal: No cyanosis in digits, neck supple  Neurology: CN 2-12 grossly intact. No speech or motor deficits  Psych: Normal affect. Alert and oriented in time, place and person  Skin: Warm, dry, normal turgor    Labs and Tests:  CBC:   Recent Labs     03/23/22  0547 03/24/22  0550 03/25/22  0445   WBC 4.4 6.0 6.2   HGB 12.6 12.2 11.8*    176 179     BMP:    Recent Labs     03/23/22  0547 03/24/22  0550 03/25/22  0445    140 139   K 3.8 4.1 4.6    103 102   CO2 23 24 27   BUN 9 10 16   CREATININE 0.6 0.7 0.9   GLUCOSE 93 108* 92     Hepatic:   Recent Labs     03/23/22  0547 03/24/22  0550 03/25/22  0445   * 174* 126*   * 325* 296*   BILITOT 6.0* 2.7* 2.0*   ALKPHOS 253* 220* 195*       Problem List  Principal Problem:    Choledocholithiasis  Resolved Problems:    * No resolved hospital problems. *       Assessment & Plan:   1. Choledocholithiasis-had ERCP 3/23 revealing sludge and small stones. CBD patent at end of procedure. Going for lap juany today. 2. Hypertension-monitor. May need outpatient antihypertensive. Needs PCP.        Diet: Diet NPO Exceptions are: Sips of Water with Meds  Code:Full Code        Wil Carson PA-C   3/25/2022 1:16 PM

## 2022-03-25 NOTE — TELEPHONE ENCOUNTER
Called pt, left vm advising her to call scheduling to schedule ct scan and to call our office to schedule follow up appt.

## 2022-03-25 NOTE — PROGRESS NOTES
Shift assessment completed. Patient alert and oriented x4. Family at bedside. Denies any pain at this time. The care plan and education reviewed and mutually agreed upon with the patient. Standard safety measures in place. 1030  Patient transported to OR for Lap juany procedure.

## 2022-03-25 NOTE — PROGRESS NOTES
Pt resting quietly in bed with eyes closed, awakens to voice, drowsy. VSS, O2 sats 97% on 3 L NC. Incisions to abdomen dry and intact, abdomen soft, ice pack remains in place. Pt seen by anesthesia, phase 1 criteria met. Will transfer pt to .

## 2022-03-25 NOTE — PROGRESS NOTES
Pt arrived from OR to PACU, awakens to voice. VSS, O2 sats 93% on room air. Incisions to abdomen dry and intact, abdomen soft, ice pack in place. Will monitor.

## 2022-03-25 NOTE — ANESTHESIA POSTPROCEDURE EVALUATION
Department of Anesthesiology  Postprocedure Note    Patient: Brien Kumar  MRN: [id number]  YOB: 1970  Date of evaluation: 3/25/2022  Time:  4:23 PM     Procedure Summary     Date: 03/25/22 Room / Location: [address]    Anesthesia Start: 1401 Anesthesia Stop: 7250    Procedure: LAPAROSCOPIC CHOLECYSTECTOMY WITH CHOLANGIOGRAM (N/A Abdomen) Diagnosis: (ACUTE CHOLECYSTITIS)    Surgeons: Leonora Garcia MD Responsible Provider: Gina Lawson MD    Anesthesia Type: general ASA Status: 3          Anesthesia Type: general    Nikita Phase I: Nikita Score: 8    Nikita Phase II:      Last vitals: Reviewed and per EMR flowsheets.        Anesthesia Post Evaluation    Patient location during evaluation: PACU  Patient participation: complete - patient participated  Level of consciousness: awake  Airway patency: patent  Nausea & Vomiting: no vomiting  Complications: no  Cardiovascular status: hemodynamically stable  Respiratory status: acceptable  Hydration status: euvolemic  Multimodal analgesia pain management approach

## 2022-03-25 NOTE — PROGRESS NOTES
Patient return to unit after procedure. Patient is alert and oriented. Reports pain 10/10 to abd. Ice therapy applied. Patient doing IS. Family at bedside. 1730  Patient ambulated to the bathroom and the door. Abd binder in place. Ice therapy applied.

## 2022-03-25 NOTE — TELEPHONE ENCOUNTER
----- Message from Humberto Qureshi sent at 3/25/2022  7:36 AM EDT -----  Sandra Boyd, The order is in Epic for the CT chest.  Can you please call pt to schedule appt and tell her to get Ct done prior to appt.     Thank you.   ----- Message -----  From: Eduarda Olsen  Sent: 3/22/2022   2:39 PM EDT  To: [facility] Support      ----- Message -----  From: Chano Singh MD  Sent: 3/22/2022   2:22 PM EDT  To: [facility]  Staff    Please schedule an appt for the patient with me in 4 weeks with CT chest with iv contrast.

## 2022-03-25 NOTE — PROGRESS NOTES
[facility]        Progress Note    Patient Name: Odalys Veliz  MRN: [id number]  YOB: 1970  Date of Evaluation: 3/25/2022    Chief Complaint: Abdominal pain    Subjective:  No acute events overnight  Denies pain   No nausea or vomiting  Resting in bed at this time      Vital Signs:  Patient Vitals for the past 24 hrs:   BP Temp Temp src Pulse Resp SpO2   22 1000 (!) 150/80 98 °F (36.7 °C) Oral 74 16 94 %   22 0800 (!) 150/87 98.4 °F (36.9 °C) Oral 66 16 94 %   22 0715 130/84 98.2 °F (36.8 °C) Oral 90 16 96 %   22 0400 (!) 144/79 98.1 °F (36.7 °C) Oral 74 16 96 %   22 0000 123/63 98.4 °F (36.9 °C) Oral 73 16 97 %   22 2113      98 %   22 2015 (!) 145/77 97.7 °F (36.5 °C) Oral 79 16 95 %   22 1658 133/79 97.5 °F (36.4 °C) Oral 72 16 96 %   22 1235 109/74 98 °F (36.7 °C) Oral 64 16 98 %      TEMPERATURE HISTORY 24H: Temp (24hrs), Av °F (36.7 °C), Min:97.5 °F (36.4 °C), Max:98.4 °F (36.9 °C)    BLOOD PRESSURE HISTORY: Systolic (47ABB), PBA:201 , Min:109 , FWH:429    Diastolic (61EZD), VN, Min:63, Max:87      Intake/Output:  I/O last 3 completed shifts: In: 1000 [P.O.:1000]  Out: 8161 [Urine:1550]  No intake/output data recorded.   Drain/tube Output:       Physical Exam:  General: awake, alert, oriented to person, place, time  Cardiovascular:  regular rate and rhythm and no murmur noted  Lungs: clear to auscultation  Abdomen: soft, nontender, nondistended, bowel sounds normal   Skin: juandice    Labs:  CBC:    Recent Labs     03/23/22  0547 22  0550 22  0445   WBC 4.4 6.0 6.2   HGB 12.6 12.2 11.8*   HCT 38.3 37.1 35.8*    176 179     BMP:    Recent Labs     22  0547 22  0550 22  0445    140 139   K 3.8 4.1 4.6    103 102   CO2 23 24 27   BUN 9 10 16   CREATININE 0.6 0.7 0.9   GLUCOSE 93 108* 92     Hepatic:    Recent Labs     22  0547 03/24/22  0550 03/25/22  0445   * 174* 126*   * 325* 296*   BILITOT 6.0* 2.7* 2.0*   ALKPHOS 253* 220* 195*     Amylase:  No results found for: AMYLASE  Lipase:    Lab Results   Component Value Date    LIPASE 25.0 03/21/2022      Mag:    Lab Results   Component Value Date    MG 2.10 03/25/2022    MG 1.90 03/24/2022     Phos:     Lab Results   Component Value Date    PHOS 3.0 03/25/2022    PHOS 2.6 03/24/2022      Coags:   Lab Results   Component Value Date    PROTIME 10.5 03/23/2022    INR 0.93 03/23/2022       Cultures:  Anaerobic culture  No results found for: LABANAE  Fungus stain  No results found for requested labs within last 30 days. Gram stain  No results found for requested labs within last 30 days. Organism  No results found for: [facility]  Surgical culture  No results found for: CXSURG  Blood culture 1  No results found for requested labs within last 30 days. Blood culture 2  No results found for requested labs within last 30 days. Fecal occult  No results found for requested labs within last 30 days. GI bacterial pathogens by PCR  No results found for requested labs within last 30 days. C. difficile  No results found for requested labs within last 30 days. Urine culture  Lab Results   Component Value Date    LABURIN No growth at 18 to 36 hours 03/21/2022       Pathology:  No relevant pathology     Imaging:  I have personally reviewed the following films:    No results found. Scheduled Meds:   sodium chloride flush  5-40 mL IntraVENous 2 times per day    enoxaparin  40 mg SubCUTAneous Daily    pantoprazole  40 mg Oral QAM AC    albuterol sulfate HFA  2 puff Inhalation BID    piperacillin-tazobactam  3,375 mg IntraVENous Q8H     Continuous Infusions:   sodium chloride      sodium chloride Stopped (03/23/22 1030)     PRN Meds:. HYDROmorphone, HYDROmorphone, oxyCODONE, ondansetron, labetalol, HYDROmorphone, aspirin-acetaminophen-caffeine, albuterol sulfate HFA, oxyCODONE, sodium chloride flush, sodium chloride, ondansetron **OR** ondansetron, polyethylene glycol, labetalol, calcium carbonate, ketorolac      Assessment:  49 y.o. female admitted with   1. Choledocholithiasis    2. Icterus    3. Abdominal pain, epigastric    4. Nausea    5. Elevated liver enzymes        Chronic cholecystitis with choledocholithiasis  Procedure Date 3/23/2022, ERCP with biliary sphincterotomy, stone/sludge removal from CBD  Pulmonary nodules  Morbid obesity, BMI 55.36      Plan:  1. No pain or nausea, VSS, labs improving, benign abdominal exam; planning for cholecystectomy today with Dr. French Kendrick  2. NPO; monitor bowel function  3. IV hydration; monitor and correct electrolytes  4. Antibiotics--on Zosyn  5. Activity as tolerated, ambulate TID, up to chair for all meals  6. Pulmonary toilet, incentive spirometry  7. PRN analgesics and antiemetics--minimizing narcotics as tolerated  8. DVT prophylaxis with Lovenox and SCD's  9. Management of medical comorbid etiologies per primary team and consulting services  10. Disposition: Anticipate discharge home in the next 24-48 hours    EDUCATION:  Educated patient on plan of care and disease process--all questions answered. Plans discussed with patient and nursing. Reviewed and discussed with Dr. French Kendrick.       Signed:  MELVIN Yeh - CNP  3/25/2022 11:03 AM     Surgery Staff:     Pt seen and examined with NP  See full note above  Doing well today  No acute change  Abd benign to exam  Will proceed with [facility] pt, all Q answered    Bernadette Burciaga MD

## 2022-03-26 VITALS
RESPIRATION RATE: 18 BRPM | DIASTOLIC BLOOD PRESSURE: 77 MMHG | OXYGEN SATURATION: 93 % | BODY MASS INDEX: 55.32 KG/M2 | SYSTOLIC BLOOD PRESSURE: 137 MMHG | HEART RATE: 86 BPM | WEIGHT: 293 LBS | TEMPERATURE: 97.8 F | HEIGHT: 61 IN

## 2022-03-26 PROCEDURE — 2500000003 HC RX 250 WO HCPCS: Performed by: SURGERY

## 2022-03-26 PROCEDURE — 2580000003 HC RX 258: Performed by: ANESTHESIOLOGY

## 2022-03-26 PROCEDURE — 6370000000 HC RX 637 (ALT 250 FOR IP): Performed by: SURGERY

## 2022-03-26 PROCEDURE — 2580000003 HC RX 258: Performed by: SURGERY

## 2022-03-26 PROCEDURE — 6360000002 HC RX W HCPCS: Performed by: SURGERY

## 2022-03-26 PROCEDURE — 99024 POSTOP FOLLOW-UP VISIT: CPT | Performed by: SURGERY

## 2022-03-26 PROCEDURE — APPNB30 APP NON BILLABLE TIME 0-30 MINS: Performed by: NURSE PRACTITIONER

## 2022-03-26 PROCEDURE — APPSS15 APP SPLIT SHARED TIME 0-15 MINUTES: Performed by: NURSE PRACTITIONER

## 2022-03-26 RX ORDER — HYDROCODONE BITARTRATE AND ACETAMINOPHEN 5; 325 MG/1; MG/1
1 TABLET ORAL ONCE
Status: DISCONTINUED | OUTPATIENT
Start: 2022-03-26 | End: 2022-03-26 | Stop reason: HOSPADM

## 2022-03-26 RX ORDER — HYDROCODONE BITARTRATE AND ACETAMINOPHEN 5; 325 MG/1; MG/1
1 TABLET ORAL EVERY 6 HOURS PRN
Qty: 12 TABLET | Refills: 0 | Status: SHIPPED | OUTPATIENT
Start: 2022-03-26 | End: 2022-03-31

## 2022-03-26 RX ADMIN — OXYCODONE 5 MG: 5 TABLET ORAL at 13:43

## 2022-03-26 RX ADMIN — PIPERACILLIN AND TAZOBACTAM 3375 MG: 3; .375 INJECTION, POWDER, LYOPHILIZED, FOR SOLUTION INTRAVENOUS at 03:50

## 2022-03-26 RX ADMIN — OXYCODONE 5 MG: 5 TABLET ORAL at 08:55

## 2022-03-26 RX ADMIN — PANTOPRAZOLE SODIUM 40 MG: 40 TABLET, DELAYED RELEASE ORAL at 06:11

## 2022-03-26 RX ADMIN — OXYCODONE 5 MG: 5 TABLET ORAL at 00:46

## 2022-03-26 RX ADMIN — LABETALOL HYDROCHLORIDE 10 MG: 5 INJECTION INTRAVENOUS at 00:58

## 2022-03-26 RX ADMIN — KETOROLAC TROMETHAMINE 15 MG: 15 INJECTION, SOLUTION INTRAMUSCULAR; INTRAVENOUS at 03:46

## 2022-03-26 RX ADMIN — SODIUM CHLORIDE: 9 INJECTION, SOLUTION INTRAVENOUS at 09:10

## 2022-03-26 RX ADMIN — SODIUM CHLORIDE, PRESERVATIVE FREE 10 ML: 5 INJECTION INTRAVENOUS at 08:59

## 2022-03-26 RX ADMIN — ENOXAPARIN SODIUM 40 MG: 40 INJECTION SUBCUTANEOUS at 08:56

## 2022-03-26 RX ADMIN — ACETAMINOPHEN, ASPIRIN, CAFFEINE 1 TABLET: 250; 65; 250 TABLET, FILM COATED ORAL at 00:57

## 2022-03-26 ASSESSMENT — PAIN DESCRIPTION - DESCRIPTORS
DESCRIPTORS: DISCOMFORT;ACHING
DESCRIPTORS: SORE;ACHING
DESCRIPTORS: DISCOMFORT
DESCRIPTORS: SORE;ACHING

## 2022-03-26 ASSESSMENT — PAIN DESCRIPTION - ONSET
ONSET: ON-GOING

## 2022-03-26 ASSESSMENT — PAIN DESCRIPTION - PROGRESSION
CLINICAL_PROGRESSION: GRADUALLY IMPROVING
CLINICAL_PROGRESSION: NOT CHANGED
CLINICAL_PROGRESSION: GRADUALLY IMPROVING
CLINICAL_PROGRESSION: GRADUALLY IMPROVING

## 2022-03-26 ASSESSMENT — PAIN SCALES - GENERAL
PAINLEVEL_OUTOF10: 7
PAINLEVEL_OUTOF10: 8
PAINLEVEL_OUTOF10: 7
PAINLEVEL_OUTOF10: 4
PAINLEVEL_OUTOF10: 7

## 2022-03-26 ASSESSMENT — PAIN DESCRIPTION - ORIENTATION
ORIENTATION: MID

## 2022-03-26 ASSESSMENT — PAIN DESCRIPTION - FREQUENCY
FREQUENCY: INTERMITTENT
FREQUENCY: INTERMITTENT
FREQUENCY: CONTINUOUS
FREQUENCY: CONTINUOUS

## 2022-03-26 ASSESSMENT — PAIN DESCRIPTION - LOCATION
LOCATION: ABDOMEN

## 2022-03-26 ASSESSMENT — PAIN DESCRIPTION - PAIN TYPE
TYPE: SURGICAL PAIN

## 2022-03-26 ASSESSMENT — PAIN - FUNCTIONAL ASSESSMENT
PAIN_FUNCTIONAL_ASSESSMENT: PREVENTS OR INTERFERES SOME ACTIVE ACTIVITIES AND ADLS
PAIN_FUNCTIONAL_ASSESSMENT: ACTIVITIES ARE NOT PREVENTED

## 2022-03-26 NOTE — PLAN OF CARE
Problem: Pain:  Goal: Pain level will decrease  Description: Pain level will decrease  3/26/2022 1512 by Yany Singleton RN  Outcome: Met This Shift  3/26/2022 0232 by Reyna Tinsley RN  Outcome: Ongoing  Goal: Control of acute pain  Description: Control of acute pain  3/26/2022 1512 by Yany Singleton RN  Outcome: Met This Shift  3/26/2022 0232 by Reyna Tinsley RN  Outcome: Ongoing  Goal: Control of chronic pain  Description: Control of chronic pain  3/26/2022 1512 by Yany Singleton RN  Outcome: Met This Shift  3/26/2022 0232 by Reyna Tinsley RN  Outcome: Ongoing

## 2022-03-26 NOTE — DISCHARGE SUMMARY
[facility]    Patient Demographics    Patient. Tisha Littlejohn  Date of Birth. 1970  MRN. [id number]     Primary care provider. No primary care provider on file. (Tel: None)    Admit date: 3/21/2022    Discharge date (blank if same as Note Date): Note Date: 3/26/2022     Reason for Hospitalization. Chief Complaint   Patient presents with    Dizziness     abdominal pain 3 days ago that has since resolved. Yellowing in both eyes since this morning. Dizziness and headache x 1 day. Denies pain, 1 episode of vomiting last night. Significant Findings. Principal Problem:    Choledocholithiasis  Active Problems:    Chronic cholecystitis  Resolved Problems:    * No resolved hospital problems. *       Problems and results from this hospitalization that need follow up. 1. Post op follow up   2. Lung nodule  3. hypertension    Significant test results and incidental findings. 1. CT chest  Redemonstration of clustered nodular opacities in the medial left lung base. The lungs are otherwise clear.  This could represent acute/sequela of prior   inflammatory process.  The possibility of a vascular nominally cannot be   evaluated in the absence of contrast.  If follow-up with contrast-enhanced CT   imaging cannot be performed, short-term CT follow-up in 3 months is   recommended to demonstrate stability.    CT abd/pelvis  1.  Cholelithiasis and mild biliary dilatation.  The possibilities of   choledocholithiasis and cholecystitis should be considered.  Consider   follow-up with MRCP.       2.  Clustered nodular opacities in the medial left lung base measure up to 12   mm.  Question vascular association.  Nonemergent follow-up with   contrast-enhanced chest CT is recommended, if not previously performed.  This   could also be assessed on contrast-enhanced MRCP.       RECOMMENDATIONS: Multiple pulmonary nodules. Most severe: 12 mm left solid pulmonary nodule   detected on incomplete chest CT. Recommend prompt non-contrast Chest CT for   further evaluation. These guidelines do not apply to immunocompromised patients and patients with   cancer. Follow up in patients with significant comorbidities as clinically   warranted. Invasive procedures and treatments. 1. ERCP by Dr Angelica Gutierrez 3/23  2. Laparoscopic cholecystectomy by Dr Mariana Jules 3/23/22    [facility] Course. Patient is a 81-year-old morbidly obese female who presented to the hospital with dizziness and right upper quadrant pain. She had also noticed her eyes were yellow. In the emergency room her total bilirubin was 6.3. She had a CT abdomen pelvis revealing cholelithiasis and mild biliary dilation concerning for choledocholithiasis. Patient was admitted with cholecystitis and choledocholithiasis. She was started on IV antibiotics and hydrated. She was seen by GI as well as general surgery. Patient underwent an ERCP with biliary sphincterotomy. She tolerated procedure well. 25th she underwent a laparoscopic cholecystectomy. Once again tolerated procedure well and was discharged home the following day in stable condition. Consults. IP CONSULT TO GENERAL SURGERY  IP CONSULT TO GI  IP CONSULT TO PULMONOLOGY    Physical examination on discharge day. /77   Pulse 86   Temp 97.8 °F (36.6 °C) (Oral)   Resp 18   Ht 5' 1\" (1.549 m)   Wt 293 lb (132.9 kg)   LMP 03/02/2022 (Approximate)   SpO2 93%   BMI 55.36 kg/m²   General appearance. Alert. Looks comfortable. HEENT. Sclera clear. Moist mucus membranes. Cardiovascular. Regular rate and rhythm, normal S1, S2. No murmur. Respiratory. Not using accessory muscles. Clear to auscultation bilaterally, no wheeze. Gastrointestinal. Abdomen soft, non-tender, not distended, normal bowel sounds  Neurology. Facial symmetry. No speech deficits.  Moving all extremities equally. Extremities. No edema in lower extremities. Skin. Warm, dry, normal turgor    Condition at time of discharge stable    Medication instructions provided to patient at discharge. Medication List      START taking these medications    HYDROcodone-acetaminophen 5-325 MG per tablet  Commonly known as: Norco  Take 1 tablet by mouth every 6 hours as needed for Pain for up to 5 days. Take lowest dose possible to manage pain; may stop taking sooner than 7 days if pain is able to be controlled with non-narcotic analgesics and therapies. CONTINUE taking these medications    albuterol sulfate  (90 Base) MCG/ACT inhaler  Commonly known as: Proventil HFA  Inhale 2 puffs into the lungs every 6 hours as needed for Wheezing. Where to Get Your Medications      You can get these medications from any pharmacy    Bring a paper prescription for each of these medications  · HYDROcodone-acetaminophen 5-325 MG per tablet         Discharge recommendations given to patient. Follow Up. PCP in 1 week, Dr Zonia Taylor in 2 week, Pulmonary in 4 weeks   Disposition. home  Activity. activity as tolerated  Diet: ADULT DIET; Regular      Spent 35 minutes in discharge process. Signed:   Faizan Suarez PA-C     3/26/2022 11:48 AM

## 2022-03-26 NOTE — OP NOTE
[place] [address]                     [address] [facility], [address] [facility]                                OPERATIVE REPORT    PATIENT NAME: Alysia Baltazar                       :        1970  MED REC NO:   [id number]                          ROOM:       7003  ACCOUNT NO:   [de-identified]                           ADMIT DATE: 2022  PROVIDER:     Fior Leal MD    DATE OF PROCEDURE:  2022    PREOPERATIVE DIAGNOSIS:  Acute on chronic cholecystitis, history of  choledocholithiasis. POSTOPERATIVE DIAGNOSIS:  Acute on chronic cholecystitis, history of  choledocholithiasis. PROCEDURE:  Laparoscopic cholecystectomy, intraoperative cholangiogram.    SURGEON:  Fior Leal MD    ANESTHESIA:  General plus local.    ESTIMATED BLOOD LOSS:  Minimal.    COMPLICATIONS:  None. INDICATIONS FOR SURGERY:  The patient is 60-year-old female admitted  with abdominal pain, cholecystitis, choledocholithiasis, and common bile  duct obstruction. She has had her common bile duct stones removed with  ERCP and presents for laparoscopic cholecystectomy at this time. Intended procedure was reviewed with the patient. All questions were  answered and she consents to proceed. ADDITIONAL DETAILS OF SURGERY:  The patient was brought into the  operating room, placed on the operating table in supine position. Compression boots were placed. General anesthetic was administered. The abdomen was prepped and draped sterilely, and time-out was done. A  supraumbilical 5-mm direct entry view port was placed and the abdominal  cavity was insufflated to 15 mmHg pressure. An epigastric 11-mm port  and 2 right lateral abdominal 5-mm ports were placed under direct  vision. The upper abdomen was visualized. The top of the gallbladder  neck was visualized and elevated superiorly at the fundus.   The  infundibular area had adhesions from the omentum and duodenum present,  and was carefully dissected, and then retracted out inferolaterally to  the right. There was a moderate amount of chronic inflammatory  stranding and density to the tissue consistent with chronic  cholecystitis. The area of the port was dissected carefully. The  critical angle view was established, and the gallbladder cystic duct  junction was clipped and partially transected. The intraoperative  cholangiogram catheter was then passed through its Angiocath in the  intra-abdominal wall and the cholangiogram was obtained. This showed  good filling through the biliary ductal system, no obstruction flow,  prompt emptying into the duodenum, and no filling defects. The  cholangiogram catheter was removed. The cystic duct was patulous as the  ductal system had previously been obstructed, and the closure was  therefore performed with 0-PDS Endoloops x2 at this site. The cystic  arteries clipped with two clips proximal, one clip distally, and was  transected. There were several additional small vessels and  inflammatory adhesion type vessels which were clipped independently. The gallbladder was removed off fossa with Bovie electrocautery. It was  then placed in specimen retrieval sac and then taken out the epigastric  11-mm port site. The perihepatic area was irrigated and aspirated dry. The gallbladder bed was hemostatic. Clips were in good location. The  Endoloops were in good location. No bleeding or bile leak was noted. The gallbladder fossa was packed with Surgicel gauze. The instruments  and ports were removed. The epigastric 11-mm port site was closed with  the PMI catheter and 0 Vicryl ties x2. Skin at all sites closed with  4-0 Monocryl suture. Dermabond glue was placed. The patient was taken  to the recovery room in stable condition postop.         Henri Castellon MD    D: 03/25/2022 15:32:56       T: 03/25/2022 15:36:36     [email or web address]  Job#: [id number]     Doc#: [id number]    CC:

## 2022-03-26 NOTE — PROGRESS NOTES
1000 Fall Rounds: Patient is a low fall risk. Patient has belongings and call light in reach. Will continue to monitor.

## 2022-03-26 NOTE — RT PROTOCOL NOTE
RT Inhaler-Nebulizer Bronchodilator Protocol Note    There is a bronchodilator order in the chart from a provider indicating to follow the RT Bronchodilator Protocol and there is an Initiate RT Inhaler-Nebulizer Bronchodilator Protocol order as well (see protocol at bottom of note). CXR Findings:  No results found. The findings from the last RT Protocol Assessment were as follows:   History Pulmonary Disease: None or smoker <15 pack years  Respiratory Pattern: Regular pattern and RR 12-20 bpm  Breath Sounds: Slightly diminished and/or crackles  Cough: Strong, spontaneous, non-productive  Indication for Bronchodilator Therapy: None  Bronchodilator Assessment Score: 2    Aerosolized bronchodilator medication orders have been revised according to the RT Inhaler-Nebulizer Bronchodilator Protocol below. Respiratory Therapist to perform RT Therapy Protocol Assessment initially then follow the protocol. Repeat RT Therapy Protocol Assessment PRN for score 0-3 or on second treatment, BID, and PRN for scores above 3. No Indications  adjust the frequency to every 6 hours PRN wheezing or bronchospasm, if no treatments needed after 48 hours then discontinue using Per Protocol order mode. If indication present, adjust the RT bronchodilator orders based on the Bronchodilator Assessment Score as indicated below. Use Inhaler orders unless patient has one or more of the following: on home nebulizer, not able to hold breath for 10 seconds, is not alert and oriented, cannot activate and use MDI correctly, or respiratory rate 25 breaths per minute or more, then use the equivalent nebulizer order(s) with same Frequency and PRN reasons based on the score. If a patient is on this medication at home then do not decrease Frequency below that used at home.     0-3  enter or revise RT bronchodilator order(s) to equivalent RT Bronchodilator order with Frequency of every 4 hours PRN for wheezing or increased work of breathing using Per Protocol order mode. 4-6  enter or revise RT Bronchodilator order(s) to two equivalent RT bronchodilator orders with one order with BID Frequency and one order with Frequency of every 4 hours PRN wheezing or increased work of breathing using Per Protocol order mode. 7-10  enter or revise RT Bronchodilator order(s) to two equivalent RT bronchodilator orders with one order with TID Frequency and one order with Frequency of every 4 hours PRN wheezing or increased work of breathing using Per Protocol order mode. 11-13  enter or revise RT Bronchodilator order(s) to one equivalent RT bronchodilator order with QID Frequency and an Albuterol order with Frequency of every 4 hours PRN wheezing or increased work of breathing using Per Protocol order mode. Greater than 13  enter or revise RT Bronchodilator order(s) to one equivalent RT bronchodilator order with every 4 hours Frequency and an Albuterol order with Frequency of every 2 hours PRN wheezing or increased work of breathing using Per Protocol order mode. RT to enter RT Home Evaluation for COPD & MDI Assessment order using Per Protocol order mode.     Electronically signed by Danya Goldmann, RCP on 3/25/2022 at 8:19 PM

## 2022-03-26 NOTE — PROGRESS NOTES
healing well, no drainage, no erythema, well approximated    Labs:  CBC:    Recent Labs     03/24/22  0550 03/25/22  0445   WBC 6.0 6.2   HGB 12.2 11.8*   HCT 37.1 35.8*    179     BMP:    Recent Labs     03/24/22  0550 03/25/22  0445    139   K 4.1 4.6    102   CO2 24 27   BUN 10 16   CREATININE 0.7 0.9   GLUCOSE 108* 92     Hepatic:    Recent Labs     03/24/22  0550 03/25/22  0445   * 126*   * 296*   BILITOT 2.7* 2.0*   ALKPHOS 220* 195*     Amylase:  No results found for: AMYLASE  Lipase:    Lab Results   Component Value Date    LIPASE 25.0 03/21/2022      Mag:    Lab Results   Component Value Date    MG 2.10 03/25/2022    MG 1.90 03/24/2022     Phos:     Lab Results   Component Value Date    PHOS 3.0 03/25/2022    PHOS 2.6 03/24/2022      Coags:   Lab Results   Component Value Date    PROTIME 10.5 03/23/2022    INR 0.93 03/23/2022       Cultures:  Anaerobic culture  No results found for: LABANAE  Fungus stain  No results found for requested labs within last 30 days. Gram stain  No results found for requested labs within last 30 days. Organism  No results found for: [facility]  Surgical culture  No results found for: CXSURG  Blood culture 1  No results found for requested labs within last 30 days. Blood culture 2  No results found for requested labs within last 30 days. Fecal occult  No results found for requested labs within last 30 days. GI bacterial pathogens by PCR  No results found for requested labs within last 30 days. C. difficile  No results found for requested labs within last 30 days. Urine culture  Lab Results   Component Value Date    LABURIN No growth at 18 to 36 hours 03/21/2022       Pathology:  Pathology results pending     Imaging:  I have personally reviewed the following films:    FL CHOLANGIOGRAM OR    Result Date: 3/25/2022  EXAMINATION: SPOT IMAGES FROM AN INTRAOPERATIVE CHOLANGIOGRAM 3/25/2022 2:05 pm COMPARISON: None.  HISTORY: ORDERING SYSTEM PROVIDED HISTORY: check for stones TECHNOLOGIST PROVIDED HISTORY: Reason for exam:->check for stones FLUOROSCOPY DOSE AND TYPE OR TIME AND EXPOSURES: 18.7 seconds fluoroscopy 6 spot films Total dose 15.02 mGy FINDINGS: Contrast is injected in bile ducts. Contrast flows into bowel. No obstructing distal common duct filling defect     Scheduled Meds:   sodium chloride flush  5-40 mL IntraVENous 2 times per day    enoxaparin  40 mg SubCUTAneous Daily    pantoprazole  40 mg Oral QAM AC    piperacillin-tazobactam  3,375 mg IntraVENous Q8H     Continuous Infusions:   sodium chloride 125 mL/hr at 03/26/22 0910    sodium chloride Stopped (03/23/22 1030)     PRN Meds:. HYDROmorphone, aspirin-acetaminophen-caffeine, albuterol sulfate HFA, oxyCODONE, sodium chloride flush, sodium chloride, ondansetron **OR** ondansetron, polyethylene glycol, calcium carbonate, ketorolac      Assessment:  49 y.o. female admitted with   1. Choledocholithiasis    2. Icterus    3. Abdominal pain, epigastric    4. Nausea    5. Elevated liver enzymes    6. Chronic cholecystitis        OR Date 3/25/2022, laparoscopic cholecystectomy, intraoperative cholangiogram for acute on chronic cholecystitis, history of choledocholithiasis  Procedure Date 3/23/2022, ERCP with biliary sphincterotomy, stone/sludge removal from CBD  Pulmonary nodules  Morbid obesity, BMI 55.36      Plan:  1. Pain controlled, tolerating diet, vitals stable, incisions healing well; no further intervention at this time  2. Regular diet as tolerated; monitor bowel function  3. Stop IV hydration; monitor and correct electrolytes  4. Antibiotics--on Zosyn, stop at discharge  5. Activity as tolerated, ambulate TID, up to chair for all meals  6. Pulmonary toilet, incentive spirometry  7. PRN analgesics and antiemetics--minimizing narcotics as tolerated, transition to PO  8. DVT prophylaxis with Lovenox and SCD's  9.  Management of medical comorbid etiologies per primary team and consulting services  10. Disposition: Okay for discharge home from a surgical perspective; follow up with Dr. Kylie Bernabe in 2 weeks    EDUCATION:  Educated patient on plan of care and disease process--all questions answered. Plans discussed with patient and nursing. Reviewed and discussed with Dr. Andrew Hess.       Signed:  MELVIN Vega - CNP  3/26/2022 11:12 AM     Postop day #1 status post laparoscopic cholecystectomy  Had previous ERCP with clearance of the bile duct  Doing well  Okay for discharge home from surgical perspective

## 2023-07-17 NOTE — PLAN OF CARE
Patient called on status of this medication be sent in. Patient is out of this. Problem: Pain:  Goal: Pain level will decrease  Description: Pain level will decrease  Outcome: Ongoing  Goal: Control of acute pain  Description: Control of acute pain  Outcome: Ongoing  Goal: Control of chronic pain  Description: Control of chronic pain  Outcome: Ongoing      PM assessment completed. Pt resting in bed, spouse at bedside. Pt c/o surgical pain. PRN toradol and oxycodone administered. Pt assisted to restroom and back to bed, tolerated activity fairly well. Pt reports tolerating general diet with no N/V. Encouraged IS use. Encouraged ambulation in morejon. Pt agreeable once pain better controlled.

## 2023-09-01 NOTE — PROCEDURES
[address] [place] [address]  Endoscopy Note    Patient: Bhavik Yanez   : 1970  CSN:     Procedure: Endoscopic retrograde cholangiopancreatography    Date: 3/23/2022    Surgeon:  Tahira Guzmán MD, MD    Referring Physician:  Kavya Carranza MD    Preoperative Diagnosis: Jaundice elevated liver enzyme    Postoperative Diagnosis: Sludge and small stones in common bile duct    Anesthesia:  General per Anesthesia. EBL: <50 mL    Indications: This is a 49y.o. year old female who came to the hospital with abdominal pain she was found to be jaundiced she was also found to have markedly elevated liver enzymes her CT scan did not show direct stone but given the fact that her bilirubin was up into the 6 range Dr. Bessy Craft who had seen her in consultation felt she had a stone trapped and asked us to do an ERCP    Procedure: Informed consent was obtained from the patient after explanation of indications, benefits, possible risks and complications of the procedure. The patient was then taken to the endoscopy suite. A time-out was performed. The patient and staff were in agreement as to the correct patient and procedure. The above anesthesia was administered by the anesthesia department. The patient was placed in the left lateral prone position. Vital signs and heart rhythm were monitored prior to and throughout the entire procedure. The Olympus [id number] Video therapeutic duodenoscope was placed in the patient's mouth and blindly advanced into the esophagus. The scope was then advanced through the esophagus, stomach and into the second portion of the duodenum where the major papilla was visualized.       The major papilla was swollen    Cholangiogram: With 1 cannulation we were in the common bile duct injection of contrast did reveal we thought was a hazy area which could be a filling defect there was some dilation of the duct but it was not markedly dilated some dye did flow freely through the cystic duct into the PT IS OUT OF TEST STRIPS    Rx Refill Request Telephone Encounter    Name:  Erin Amin  :  [id number]  Medication Name:    ONE TOUCH VERIO TEST STRIPS   Specific Pharmacy location:  [facility]  Date of last appointment:  2023  Date of next appointment:  2023  Best number to reach patient:  [phone]             gallbladder    The intrahepatics did have a slight dilation to the    We went ahead and made a sizable biliary sphincterotomy and as we are making the biliary sphincterotomy the start green-black bile and heavy sludge started  flowing out of the common bile    Using an 8 wire basket we swept multiple times no further stones but some sludge did not did come out we did several repeat cholangiography's which showed that the duct was clear she was draining ludwig bile at this time and we felt that we could terminate the procedure we did not feel she needed a stent because she was having her gallbladder removed tomorrow    Patient had been on antibiotics on her medical allred so we did not need to give her any antibiotic    We did not touch the pancreatic duct so we did not use any indomethacin      Pancreatogram: Not cannulate    The cannulas were then withdrawn. The duodenum and stomach were decompressed and the scope was withdrawn from the patient. The patient tolerated the procedure well and was taken to the post anesthesia care unit in good condition. Radiological Interpretation:  All fluoroscopic images and  still x-ray films were carefullly examined and interpreted by the endoscopist on the spot during the procedure in the absence of radiologist.  These interpretations guided the course of the procedure and the interventions mentioned above and below.         Impression: Heavy sludge and small stones in common bile      Recommendations: N.p.o. for the next 4 hours then advance diet as  Gallbladder removal surgery tomorrow  Control any pain and nausea and postop  Hydrate patient well and postop  Thank you for this very kind referral today      Yury Lutz MD  [address]  3/23/2022

## (undated) DEVICE — ELECTRODE PT RET AD L9FT HI MOIST COND ADH HYDRGEL CORDED

## (undated) DEVICE — PMI DISPOSABLE PUNCTURE CLOSURE DEVICE / SUTURE GRASPER: Brand: PMI

## (undated) DEVICE — APPLICATOR MEDICATED 26 CC SOLUTION HI LT ORNG CHLORAPREP

## (undated) DEVICE — DRAPE,LAP,CHOLE,W/TROUGHS,STERILE: Brand: MEDLINE

## (undated) DEVICE — GOWN SIRUS NONREIN LG W/TWL: Brand: MEDLINE INDUSTRIES, INC.

## (undated) DEVICE — SYRINGE MED 50ML LUERLOCK TIP

## (undated) DEVICE — SYRINGE MED 30ML STD CLR PLAS LUERLOCK TIP N CTRL DISP

## (undated) DEVICE — BW-412T DISP COMBO CLEANING BRUSH: Brand: SINGLE USE COMBINATION CLEANING BRUSH

## (undated) DEVICE — BAG RETRIEVAL SPECIMEN SUPERBAG 10 MEDIUM NYLON ITRODUCER

## (undated) DEVICE — SOLUTION IRRIG 1000ML 0.9% SOD CHL USP POUR PLAS BTL

## (undated) DEVICE — MOUTHPIECE ENDOSCP L CTRL OPN AND SIDE PORTS DISP

## (undated) DEVICE — C-ARM: Brand: UNBRANDED

## (undated) DEVICE — MERCY FAIRFIELD TURNOVER KIT: Brand: MEDLINE INDUSTRIES, INC.

## (undated) DEVICE — LAPAROSCOPY PACK: Brand: MEDLINE INDUSTRIES, INC.

## (undated) DEVICE — TROCAR: Brand: KII FIOS FIRST ENTRY

## (undated) DEVICE — GLOVE SURG SZ 6.5 L11.2IN FNGR THK9.8MIL STRW LTX POLYMER

## (undated) DEVICE — Device

## (undated) DEVICE — APPLIER CLP M/L SHFT DIA5MM 15 LIG LIGAMAX 5

## (undated) DEVICE — Z CONVERTED USE 2271182 CUP DENT W4XL3IN D2IN GRN LEAK RESIST DBL SEAL CLSR ETCHED

## (undated) DEVICE — Z DISCONTINUED USE 2272117 DRAPE SURG 3 QTR N INVASIVE 2 LAYR DISP

## (undated) DEVICE — PROCEDURE KIT ENDOSCP CUST

## (undated) DEVICE — Device: Brand: SINGLE USE SOFT BRUSH

## (undated) DEVICE — SYRINGE, LUER LOCK, 10ML: Brand: MEDLINE

## (undated) DEVICE — ADHESIVE SKIN CLSR 0.7ML TOP DERMBND ADV

## (undated) DEVICE — INDICATED FOR USE DURING OPEN AND LAPAROSCOPIC CHOLECYSTECTOMY PROCEDURES TO INJECT RADIOPAQUE MEDIA THROUGH THE CYSTIC DUCT INTO THE BILIARY TREE.: Brand: AEROSTAT®

## (undated) DEVICE — CORD ES L10FT MPLR LAP

## (undated) DEVICE — POSITIONER HD W8XH4XL8.5IN RASPBERRY FOAM SLT

## (undated) DEVICE — LAPAROSCOPIC TROCAR SLEEVE/SINGLE USE: Brand: KII® LOW PROFILE OPTICAL ACCESS SYSTEM

## (undated) DEVICE — TROCAR SLEEVE: Brand: KII ® LOW PROFILE SLEEVE

## (undated) DEVICE — LOOP LIG SUT SZ 0 L18IN ABSRB POLYDIOXANONE MFIL PDS II

## (undated) DEVICE — HYPODERMIC SAFETY NEEDLE: Brand: MAGELLAN

## (undated) DEVICE — SOLUTION IV IRRIG WATER 500ML POUR BRL ST 2F7113

## (undated) DEVICE — CANNULATING SPHINCTEROTOME: Brand: TRUETOME JAG 44

## (undated) DEVICE — SUTURE MCRYL + SZ 4-0 L27IN ABSRB UD L19MM PS-2 3/8 CIR MCP426H

## (undated) DEVICE — SYRINGE MED 10ML TRNSLUC BRL PLUNG BLK MRK POLYPR CTRL

## (undated) DEVICE — COVER LT HNDL BLU PLAS

## (undated) DEVICE — VALVE SUCTION AIR H2O SET ORCA POD + DISP

## (undated) DEVICE — MEMORY EIGHT WIRE BASKET: Brand: MEMORY BASKET